# Patient Record
Sex: FEMALE | Race: WHITE | NOT HISPANIC OR LATINO | Employment: OTHER | URBAN - METROPOLITAN AREA
[De-identification: names, ages, dates, MRNs, and addresses within clinical notes are randomized per-mention and may not be internally consistent; named-entity substitution may affect disease eponyms.]

---

## 2018-04-03 ENCOUNTER — HOSPITAL ENCOUNTER (OUTPATIENT)
Dept: FAMILY MEDICINE CLINIC | Facility: CLINIC | Age: 78
Discharge: HOME | End: 2018-04-03
Attending: NURSE PRACTITIONER | Admitting: NURSE PRACTITIONER

## 2018-08-01 ENCOUNTER — HOSPITAL ENCOUNTER (OUTPATIENT)
Dept: GENERAL RADIOLOGY | Facility: HOSPITAL | Age: 78
Discharge: HOME | End: 2018-08-01
Attending: NURSE PRACTITIONER | Admitting: NURSE PRACTITIONER

## 2019-06-21 ENCOUNTER — CLINICAL SUPPORT (OUTPATIENT)
Dept: FAMILY MEDICINE CLINIC | Facility: CLINIC | Age: 79
End: 2019-06-21

## 2019-06-21 VITALS — SYSTOLIC BLOOD PRESSURE: 144 MMHG | DIASTOLIC BLOOD PRESSURE: 69 MMHG | HEART RATE: 72 BPM

## 2019-07-17 RX ORDER — LISINOPRIL 40 MG/1
1 TABLET ORAL DAILY
Refills: 0 | COMMUNITY
Start: 2019-04-18 | End: 2019-07-17 | Stop reason: SDUPTHER

## 2019-07-17 RX ORDER — BUMETANIDE 1 MG/1
1 TABLET ORAL DAILY
Qty: 90 TABLET | Refills: 0 | Status: SHIPPED | OUTPATIENT
Start: 2019-07-17 | End: 2019-12-02 | Stop reason: SDUPTHER

## 2019-07-17 RX ORDER — LISINOPRIL 40 MG/1
40 TABLET ORAL DAILY
Qty: 90 TABLET | Refills: 0 | Status: SHIPPED | OUTPATIENT
Start: 2019-07-17 | End: 2019-10-18 | Stop reason: SDUPTHER

## 2019-07-17 RX ORDER — BUMETANIDE 1 MG/1
1 TABLET ORAL DAILY
COMMUNITY
Start: 2015-10-21 | End: 2019-07-17 | Stop reason: SDUPTHER

## 2019-09-06 PROBLEM — Z72.89 OTHER PROBLEMS RELATED TO LIFESTYLE: Status: ACTIVE | Noted: 2017-12-18

## 2019-09-06 PROBLEM — R25.2 CRAMP OF EXTREMITY: Status: ACTIVE | Noted: 2017-01-25

## 2019-09-06 PROBLEM — R07.89 CHEST DISCOMFORT: Status: ACTIVE | Noted: 2018-04-03

## 2019-09-06 PROBLEM — R42 DIZZINESS: Status: ACTIVE | Noted: 2018-07-02

## 2019-09-06 PROBLEM — M54.9 BACK PAIN: Status: ACTIVE | Noted: 2018-04-03

## 2019-09-06 PROBLEM — J30.9 ALLERGIC RHINITIS: Status: ACTIVE | Noted: 2018-10-16

## 2019-09-06 PROBLEM — Z13.29 SCREENING FOR THYROID DISORDER: Status: ACTIVE | Noted: 2017-05-24

## 2019-09-06 PROBLEM — Z78.0 POSTMENOPAUSAL: Status: ACTIVE | Noted: 2017-12-18

## 2019-09-06 PROBLEM — R05.9 COUGH: Status: ACTIVE | Noted: 2018-04-03

## 2019-09-06 RX ORDER — PRAVASTATIN SODIUM 40 MG
1 TABLET ORAL NIGHTLY
COMMUNITY
Start: 2016-04-19 | End: 2020-08-12

## 2019-09-06 RX ORDER — CHOLECALCIFEROL (VITAMIN D3) 25 MCG
1 TABLET,CHEWABLE ORAL DAILY
COMMUNITY
Start: 2017-05-25

## 2019-09-09 ENCOUNTER — OFFICE VISIT (OUTPATIENT)
Dept: FAMILY MEDICINE CLINIC | Facility: CLINIC | Age: 79
End: 2019-09-09

## 2019-09-09 VITALS
HEIGHT: 64 IN | DIASTOLIC BLOOD PRESSURE: 79 MMHG | HEART RATE: 69 BPM | WEIGHT: 231 LBS | OXYGEN SATURATION: 94 % | SYSTOLIC BLOOD PRESSURE: 176 MMHG | TEMPERATURE: 98 F | BODY MASS INDEX: 39.44 KG/M2

## 2019-09-09 DIAGNOSIS — M54.50 ACUTE RIGHT-SIDED LOW BACK PAIN WITHOUT SCIATICA: Primary | ICD-10-CM

## 2019-09-09 DIAGNOSIS — M62.830 MUSCLE SPASM OF BACK: ICD-10-CM

## 2019-09-09 PROCEDURE — 99213 OFFICE O/P EST LOW 20 MIN: CPT | Performed by: FAMILY MEDICINE

## 2019-09-09 RX ORDER — PREDNISONE 20 MG/1
40 TABLET ORAL DAILY
Qty: 10 TABLET | Refills: 0 | Status: SHIPPED | OUTPATIENT
Start: 2019-09-09 | End: 2019-09-14

## 2019-09-09 RX ORDER — ALBUTEROL SULFATE 90 UG/1
2 AEROSOL, METERED RESPIRATORY (INHALATION) EVERY 4 HOURS PRN
COMMUNITY
Start: 2018-10-08

## 2019-09-09 RX ORDER — FUROSEMIDE 40 MG/1
40 TABLET ORAL DAILY
COMMUNITY
End: 2019-09-09

## 2019-09-09 RX ORDER — CYCLOBENZAPRINE HCL 5 MG
5 TABLET ORAL
Qty: 14 TABLET | Refills: 0 | Status: SHIPPED | OUTPATIENT
Start: 2019-09-09 | End: 2022-08-12

## 2019-09-09 NOTE — PROGRESS NOTES
Subjective   Neida Hernandez is a 79 y.o. female.   Chief Complaint   Patient presents with   • Back Pain     pain x 1 week         History of Present Illness   Presents today with a one-week history of right-sided lower back pain.  No injury.  No falls.  She did have a cold and was coughing a lot a week and half ago.  She thinks she may have pulled something when she coughed.  The pain does not radiate down into her leg.  No loss of bowel or bladder control.  She has a problem with recurrent kidney stones and went to see her urologist ordered a CT and told her there were no kidney stones.  She has had episodes of back pain off and on throughout her life but nothing has lasted 1 week.    Patient Active Problem List    Diagnosis Date Noted   • Muscle spasm of back 09/09/2019   • Allergic rhinitis 10/16/2018   • Dizziness 07/02/2018   • Back pain 04/03/2018   • Chest discomfort 04/03/2018   • Cough 04/03/2018   • Other problems related to lifestyle 12/18/2017   • Postmenopausal 12/18/2017   • Screening for thyroid disorder 05/24/2017   • Cramp of extremity 01/25/2017   • Visit for screening mammogram 07/29/2016   • Sjogren's syndrome (CMS/HCC) 01/20/2016   • Degenerative arthritis 10/21/2015   • Diabetes mellitus, type II (CMS/[facility]) 10/21/2015   • Dyslipidemia 10/21/2015   • Essential hypertension 10/21/2015   • Hernia of anterior abdominal wall 10/21/2015   • History of renal calculi 10/21/2015   • Obesity with body mass index 30 or greater 10/21/2015   • Pedal edema 10/21/2015   • Sleep apnea 10/21/2015           Past Surgical History:   Procedure Laterality Date   • CARDIAC CATHETERIZATION  06/2013   • REPLACEMENT TOTAL KNEE Left 2013   • VENTRAL HERNIA REPAIR      x 3        Current Outpatient Medications:   •  albuterol sulfate  (90 Base) MCG/ACT inhaler, Inhale 2 puffs Every 4 (Four) Hours As Needed., Disp: , Rfl:   •  bumetanide (BUMEX) 1 MG tablet, Take 1 tablet by mouth Daily., Disp: 90 tablet, Rfl: 0  •   lisinopril (PRINIVIL,ZESTRIL) 40 MG tablet, Take 1 tablet by mouth Daily., Disp: 90 tablet, Rfl: 0  •  LUTEIN PO, Take 1 capsule by mouth Daily., Disp: , Rfl:   •  Cyanocobalamin (B-12) 1000 MCG tablet controlled-release, Take 1 tablet by mouth Daily., Disp: , Rfl:   •  cyclobenzaprine (FLEXERIL) 5 MG tablet, Take 1 tablet by mouth every night at bedtime., Disp: 14 tablet, Rfl: 0  •  pravastatin (PRAVACHOL) 40 MG tablet, Take 1 tablet by mouth Every Night., Disp: , Rfl:   •  predniSONE (DELTASONE) 20 MG tablet, Take 2 tablets by mouth Daily for 5 days., Disp: 10 tablet, Rfl: 0  Allergies   Allergen Reactions   • Codeine GI Intolerance     Social History     Socioeconomic History   • Marital status:      Spouse name: Not on file   • Number of children: Not on file   • Years of education: Not on file   • Highest education level: Not on file   Tobacco Use   • Smoking status: Never Smoker   • Smokeless tobacco: Never Used   Substance and Sexual Activity   • Alcohol use: No     Frequency: Never   • Drug use: No     Family History   Problem Relation Age of Onset   • Hypertension Mother    • Heart disease Mother    • Stroke Mother    • Hypertension Father    • Heart disease Father    • No Known Problems Sister    • No Known Problems Daughter    • Kidney disease Son    • No Known Problems Sister    • No Known Problems Sister    • No Known Problems Sister    • No Known Problems Son    • No Known Problems Son      The following portions of the patient's history were reviewed and updated as appropriate: allergies, current medications, past family history, past medical history, past social history, past surgical history and problem list.    Review of Systems   Constitutional: Negative for chills and fever.   Respiratory: Negative for cough and shortness of breath.    Cardiovascular: Negative for chest pain.   Gastrointestinal: Negative for abdominal pain.   Neurological: Negative for light-headedness and headache.  "      Objective   /79 (BP Location: Right arm, Patient Position: Sitting, Cuff Size: Adult)   Pulse 69   Temp 98 °F (36.7 °C) (Oral)   Ht 163.8 cm (64.49\")   Wt 105 kg (231 lb)   SpO2 94%   BMI 39.05 kg/m²   Physical Exam   Constitutional: She is oriented to person, place, and time. She appears well-developed and well-nourished.   HENT:   Head: Normocephalic and atraumatic.   Neck: Neck supple. No JVD present.   Cardiovascular: Normal rate and regular rhythm.   Pulmonary/Chest: Effort normal. No respiratory distress. She exhibits no tenderness.   Abdominal: Soft. She exhibits no distension and no mass. There is no tenderness. There is no rebound and no guarding.   Musculoskeletal: She exhibits no edema.        Lumbar back: She exhibits decreased range of motion, tenderness (to right lower back under belt line) and spasm. She exhibits no swelling and no deformity.   Neurological: She is alert and oriented to person, place, and time. She displays no tremor. No sensory deficit. She exhibits normal muscle tone. Gait abnormal.   Skin: Skin is warm. No rash noted.   Psychiatric: She has a normal mood and affect. Her behavior is normal.               Assessment/Plan   Diagnoses and all orders for this visit:    1. Acute right-sided low back pain without sciatica (Primary)  -     XR Spine Lumbar 4+ View (In Office)    2. Muscle spasm of back  -     XR Spine Lumbar 4+ View (In Office)    Other orders  -     predniSONE (DELTASONE) 20 MG tablet; Take 2 tablets by mouth Daily for 5 days.  Dispense: 10 tablet; Refill: 0  -     cyclobenzaprine (FLEXERIL) 5 MG tablet; Take 1 tablet by mouth every night at bedtime.  Dispense: 14 tablet; Refill: 0    Apply heat to her lower back.  Flexeril at bedtime only.  Redness on burst.  Begin gentle stretching in 2 to 3 days.  We will do an x-ray to better evaluate her bony anatomy.  If she does not get relief within a week, call back and let me know and we will arrange an " outpatient physical therapy.  We are awaiting final report from her urologist just to verify the findings.  Call with any other problems or concerns before next visit.             Return if symptoms worsen or fail to improve.

## 2019-09-10 ENCOUNTER — TELEPHONE (OUTPATIENT)
Dept: FAMILY MEDICINE CLINIC | Facility: CLINIC | Age: 79
End: 2019-09-10

## 2019-09-10 NOTE — TELEPHONE ENCOUNTER
----- Message from Clarissa Ward MD sent at 9/10/2019  8:30 AM EDT -----  Please tell Neida that her x-ray of her lower back and did show a lot of arthritis in those joints called facet joints.  This is what we talked about in the office and what I suspected we would see.  The CAT scan that was done to rule out kidney stones did not see her lumbar spine at all, so there is no additional information there.  If her back does not feel better in a week, let me know.  Thanks

## 2019-10-18 RX ORDER — LISINOPRIL 40 MG/1
40 TABLET ORAL DAILY
Qty: 90 TABLET | Refills: 0 | Status: SHIPPED | OUTPATIENT
Start: 2019-10-18 | End: 2020-01-13 | Stop reason: SDUPTHER

## 2019-10-18 NOTE — TELEPHONE ENCOUNTER
Last OV: 9-9-19  Next OV: None scheduled yet  Last Labs: 4-3-18    Med is loaded and ready to refill.

## 2019-10-18 NOTE — TELEPHONE ENCOUNTER
Patient is needing a refill on her Lisinopril 40 Mg-Please send to [facility] in [place]

## 2019-12-02 DIAGNOSIS — R60.0 PEDAL EDEMA: ICD-10-CM

## 2019-12-02 DIAGNOSIS — I10 ESSENTIAL HYPERTENSION: Primary | ICD-10-CM

## 2019-12-02 RX ORDER — BUMETANIDE 1 MG/1
1 TABLET ORAL DAILY
Qty: 90 TABLET | Refills: 0 | Status: SHIPPED | OUTPATIENT
Start: 2019-12-02 | End: 2020-04-03 | Stop reason: SDUPTHER

## 2019-12-02 NOTE — TELEPHONE ENCOUNTER
Last OV: 9-9-19  Next OV: NONE scheduled  Last Labs: 4-3-18    Med is loaded and ready to send upon approval. Thanks.

## 2020-01-13 RX ORDER — LISINOPRIL 40 MG/1
40 TABLET ORAL DAILY
Qty: 90 TABLET | Refills: 0 | Status: SHIPPED | OUTPATIENT
Start: 2020-01-13 | End: 2020-04-03 | Stop reason: SDUPTHER

## 2020-02-13 ENCOUNTER — OFFICE VISIT (OUTPATIENT)
Dept: FAMILY MEDICINE CLINIC | Facility: CLINIC | Age: 80
End: 2020-02-13

## 2020-02-13 VITALS
HEIGHT: 64 IN | BODY MASS INDEX: 38.93 KG/M2 | TEMPERATURE: 97.8 F | WEIGHT: 228 LBS | DIASTOLIC BLOOD PRESSURE: 77 MMHG | HEART RATE: 72 BPM | SYSTOLIC BLOOD PRESSURE: 148 MMHG | OXYGEN SATURATION: 92 %

## 2020-02-13 DIAGNOSIS — J30.9 ALLERGIC RHINITIS, UNSPECIFIED SEASONALITY, UNSPECIFIED TRIGGER: Primary | ICD-10-CM

## 2020-02-13 PROCEDURE — 99213 OFFICE O/P EST LOW 20 MIN: CPT | Performed by: NURSE PRACTITIONER

## 2020-02-13 RX ORDER — FLUTICASONE PROPIONATE 50 MCG
2 SPRAY, SUSPENSION (ML) NASAL DAILY
Qty: 1 BOTTLE | Refills: 2 | Status: SHIPPED | OUTPATIENT
Start: 2020-02-13

## 2020-02-13 RX ORDER — AZITHROMYCIN 250 MG/1
TABLET, FILM COATED ORAL
Qty: 6 TABLET | Refills: 0 | Status: SHIPPED | OUTPATIENT
Start: 2020-02-13 | End: 2020-08-12

## 2020-02-13 RX ORDER — PSEUDOEPHEDRINE HCL 30 MG
30 TABLET ORAL EVERY 4 HOURS PRN
Qty: 30 TABLET | Refills: 2 | Status: SHIPPED | OUTPATIENT
Start: 2020-02-13 | End: 2020-08-12

## 2020-02-13 NOTE — PROGRESS NOTES
"    Neida Hernandez is a 80 y.o. female.      79-year-old obese white female with history of stenting for arthritis type 2 diabetes, hyperlipidemia, hypertension, hiatal hernia and kidney stones who comes in today with complaints of nonproductive cough and nasal congestion.  Exam reveals severe allergic rhinitis and a placing her on allergy medication.  Patient states she goes into sinus infections easily and is going on a trip so I ordered her Z-Nicolas to take with her in case she needs it   blood pressure 148/76 heart rate 72 denies any chest pain, dyspnea, tachycardia dizziness      Home Allegra/ Flonase nasal spray/ Sudafed 30 mg q.i.d.   Z-Nicolas       The following portions of the patient's history were reviewed and updated as appropriate: allergies, current medications, past family history, past medical history, past social history, past surgical history and problem list.    Vitals:    02/13/20 0855   BP: 148/77   BP Location: Right arm   Patient Position: Sitting   Cuff Size: Large Adult   Pulse: 72   Temp: 97.8 °F (36.6 °C)   TempSrc: Oral   SpO2: 92%   Weight: 103 kg (228 lb)   Height: 162.6 cm (64\")     Body mass index is 39.14 kg/m².    Past Medical History:   Diagnosis Date   • Abdominal wall hernia    • Degenerative arthritis    • Glucose intolerance    • Hypertension    • Nephrolithiasis    • Pedal edema    • Sinusitis    • Sleep apnea      Past Surgical History:   Procedure Laterality Date   • CARDIAC CATHETERIZATION  06/2013   • REPLACEMENT TOTAL KNEE Left 2013   • VENTRAL HERNIA REPAIR      x 3      Family History   Problem Relation Age of Onset   • Hypertension Mother    • Heart disease Mother    • Stroke Mother    • Hypertension Father    • Heart disease Father    • No Known Problems Sister    • No Known Problems Daughter    • Kidney disease Son    • No Known Problems Sister    • No Known Problems Sister    • No Known Problems Sister    • No Known Problems Son    • No Known Problems Son  "     Immunization History   Administered Date(s) Administered   • Fluzone High Dose =>65 Years ([employer] ONLY) 10/21/2015, 10/26/2016, 11/17/2019   • Pneumococcal Conjugate 13-Valent (PCV13) 10/28/2015   • Pneumococcal Polysaccharide (PPSV23) 01/25/2017       Office Visit Converted on 04/03/2018   Component Date Value Ref Range Status   • BUN 01/21/2016 18  7 - 25 mg/dL Final   • BUN/Creatinine Ratio 01/21/2016 NOT APPLICABLE (calc)  6 - 22 Final   • Calcium 01/21/2016 9.3  8.6 - 10.4 mg/dL Final   • Chloride 01/21/2016 102  98 - 110 mmol/L Final   • CO2 CONTENT  01/21/2016 30  19 - 30 mmol/L Final   • Creatinine 01/21/2016 0.88  0.60 - 0.93 mg/dL Final   • eGFR African Am 01/21/2016 64  > OR = 60 mL/min/1.73m2 Final   • eGFR Non  Am 01/21/2016 74  > OR = 60 mL/min/1.73m2 Final   • Glucose 01/21/2016 125* 65 - 99 mg/dL Final   • Potassium 01/21/2016 4.2  3.5 - 5.3 mmol/L Final   • Sodium 01/21/2016 142  135 - 146 mmol/L Final   • TSH 01/21/2016 1.58  0.40 - 4.50 mIU/L Final   • Albumin 07/26/2016 3.9  3.6 - 5.1 g/dL Final   • Alkaline Phosphatase 07/26/2016 62  33 - 130 units/L Final   • Total Bilirubin 07/26/2016 0.4  0.2 - 1.2 mg/dL Final   • BUN 07/26/2016 16  7 - 25 mg/dL Final   • BUN/Creatinine Ratio 07/26/2016 NOT APPLICABLE (calc)  6 - 22 Final   • Calcium 07/26/2016 8.9  8.6 - 10.4 mg/dL Final   • Chloride 07/26/2016 103  98 - 110 mmol/L Final   • Chol/HDL Ratio 07/26/2016 4.6 (calc)  < OR = 5.0 Final   • Total Cholesterol 07/26/2016 180  125 - 200 mg/dL Final   • CO2 CONTENT  07/26/2016 31* 19 - 30 mmol/L Final   • Creatinine 07/26/2016 0.87  0.60 - 0.93 mg/dL Final   • eGFR African Am 07/26/2016 65  > OR = 60 mL/min/1.73m2 Final   • eGFR Non African Am 07/26/2016 75  > OR = 60 mL/min/1.73m2 Final   • Globulin 07/26/2016 2.7 G/DL (CALC)  1.9 - 3.7 g/dL Final   • Glucose 07/26/2016 120* 65 - 99 mg/dL Final   • HDL Cholesterol 07/26/2016 39* > OR = 46 mg/dL Final   • % Hgb A1C 07/26/2016 7.0 % OF TOTAL  HGB* <5.7 % Final   • LDL Cholesterol  07/26/2016 99 MG/DL (CALC)  <130 mg/dL Final   • Potassium 07/26/2016 4.3  3.5 - 5.3 mmol/L Final   • Total Protein 07/26/2016 6.6  6.1 - 8.1 g/dL Final   • AST (SGOT) 07/26/2016 15  10 - 35 units/L Final   • ALT (SGPT) 07/26/2016 14  6 - 29 units/L Final   • Sodium 07/26/2016 138  135 - 146 mmol/L Final   • Triglycerides 07/26/2016 212* <150 mg/dL Final   • A/G Ratio 07/26/2016 1.4 (calc)  1.0 - 2.5 Final   • Albumin 10/26/2016 3.5* 3.6 - 5.1 g/dL Final   • Alkaline Phosphatase 10/26/2016 59  33 - 130 units/L Final   • Glucose 10/26/2016 122* 65 - 99 mg/dL Final   • Total Bilirubin 10/26/2016 0.5  0.2 - 1.2 mg/dL Final   • BUN 10/26/2016 18  7 - 25 mg/dL Final   • BUN/Creatinine Ratio 10/26/2016 22.5 (calc)* 6 - 22 Final   • Calcium 10/26/2016 9.1  8.6 - 10.2 mg/dL Final   • Chloride 10/26/2016 103  98 - 110 mmol/L Final   • Chol/HDL Ratio 10/26/2016 5.0 (calc)  <5.1 Final   • Total Cholesterol 10/26/2016 192  125 - 200 mg/dL Final   • CO2 10/26/2016 30  21 - 33 mmol/L Final   • Creatinine 10/26/2016 0.8  0.63 - 1.22 mg/dL Final   • eGFR  Am 10/26/2016 >60 mL/min/1.73m2  >59 mL/min/1.73m2 Final   • eGFR Non African Am 10/26/2016 >60 mL/min/1.73m2  >59 mL/min/1.73m2 Final   • Globulin 10/26/2016 3.0 G/DL (CALC)  2.2 - 3.9 g/dL Final   • HDL Cholesterol 10/26/2016 38* >46 mg/dL Final   • % Hgb A1C 10/26/2016 7.0 % OF TOTAL HGB* <5.7 % Final   • LDL Cholesterol  10/26/2016 113  <130 mg/dL Final   • Potassium 10/26/2016 4.3  3.5 - 5.3 mmol/L Final   • Total Protein 10/26/2016 6.5  6.2 - 8.3 g/dL Final   • AST (SGOT) 10/26/2016 21  10 - 35 units/L Final   • ALT (SGPT) 10/26/2016 18  6 - 40 units/L Final   • Sodium 10/26/2016 141  135 - 146 mmol/L Final   • Triglycerides 10/26/2016 205* <150 mg/dL Final   • A/G Ratio 10/26/2016 1.2 (calc)  1.0 - 2.1 Final   • Albumin 01/26/2017 3.7  3.6 - 5.1 g/dL Final   • Alkaline Phosphatase 01/26/2017 61  33 - 130 units/L Final   •  Glucose 01/26/2017 115* 65 - 99 mg/dL Final   • Total Bilirubin 01/26/2017 0.5  0.2 - 1.2 mg/dL Final   • BUN 01/26/2017 14  7 - 25 mg/dL Final   • BUN/Creatinine Ratio 01/26/2017 17.5 (calc)  6 - 22 Final   • Calcium 01/26/2017 9.1  8.6 - 10.2 mg/dL Final   • Chloride 01/26/2017 102  98 - 110 mmol/L Final   • CO2 01/26/2017 30  21 - 33 mmol/L Final   • Creatinine 01/26/2017 0.8  0.63 - 1.22 mg/dL Final   • eGFR  Am 01/26/2017 >60 mL/min/1.73m2  >59 mL/min/1.73m2 Final   • eGFR Non African Am 01/26/2017 >60 mL/min/1.73m2  >59 mL/min/1.73m2 Final   • Globulin 01/26/2017 2.9 G/DL (CALC)  2.2 - 3.9 g/dL Final   • % Hgb A1C 01/26/2017 6.8 % OF TOTAL HGB* <5.7 % Final   • Magnesium 01/26/2017 2.2  1.5 - 2.5 mg/dL Final   • Potassium 01/26/2017 3.7  3.5 - 5.3 mmol/L Final   • Total Protein 01/26/2017 6.6  6.2 - 8.3 g/dL Final   • AST (SGOT) 01/26/2017 22  10 - 35 units/L Final   • ALT (SGPT) 01/26/2017 20  6 - 40 units/L Final   • Sodium 01/26/2017 140  135 - 146 mmol/L Final   • A/G Ratio 01/26/2017 1.3 (calc)  1.0 - 2.1 Final   • Albumin 05/25/2017 4.1  3.6 - 5.1 g/dL Final   • Alkaline Phosphatase 05/25/2017 52  33 - 130 units/L Final   • Vitamin B-12 05/25/2017 152* 200 - 1100 pg/mL Final   • Basophils Absolute 05/25/2017 48 CELLS/UL  0 - 200 10*3/mm3 Final   • Basophil Rel % 05/25/2017 0.7  % Final   • Total Bilirubin 05/25/2017 0.4  0.2 - 1.2 mg/dL Final   • BNP 05/25/2017 26  <100 pg/mL Final   • BUN 05/25/2017 20  7 - 25 mg/dL Final   • BUN/Creatinine Ratio 05/25/2017 NOT APPLICABLE (calc)  6 - 22 Final   • Calcium 05/25/2017 9.5  8.6 - 10.4 mg/dL Final   • Chloride 05/25/2017 103  98 - 110 mmol/L Final   • Chol/HDL Ratio 05/25/2017 4.1 (calc)  < OR = 5.0 Final   • Total Cholesterol 05/25/2017 160  125 - 200 mg/dL Final   • CO2 CONTENT  05/25/2017 29  20 - 31 mmol/L Final   • Creatinine 05/25/2017 0.87  0.60 - 0.93 mg/dL Final   • eGFR African Am 05/25/2017 64  > OR = 60 mL/min/1.73m2 Final   • eGFR Non   Am 05/25/2017 74  > OR = 60 mL/min/1.73m2 Final   • Eosinophils Absolute 05/25/2017 156 CELLS/UL  15 - 500 10*3/mm3 Final   • Eosinophil Rel % 05/25/2017 2.3  % Final   • Globulin 05/25/2017 2.6 G/DL (CALC)  1.9 - 3.7 g/dL Final   • Glucose 05/25/2017 118* 65 - 99 mg/dL Final   • Hematocrit 05/25/2017 38.0  35.0 - 45.0 % Final   • HDL Cholesterol 05/25/2017 39* > OR = 46 mg/dL Final   • Hemoglobin 05/25/2017 12.5  11.7 - 15.5 g/dL Final   • % Hgb A1C 05/25/2017 6.7 % OF TOTAL HGB* <5.7 % Final   • LDL Cholesterol  05/25/2017 88 MG/DL (CALC)  <130 mg/dL Final   • Lymphocytes Absolute 05/25/2017 2679 CELLS/UL  850 - 3900 10*3/mm3 Final   • Lymphocyte Rel % 05/25/2017 39.4  % Final   • MCH 05/25/2017 30.7  27.0 - 33.0 pg Final   • MCHC 05/25/2017 32.9 G/DL  32.0 - 36.0 % Final   • MCV 05/25/2017 93.4  80.0 - 100.0 fL Final   • Monocyte Rel % 05/25/2017 8.9  % Final   • Monocytes Absolute 05/25/2017 605 CELLS/UL  200 - 950 10*3/microliter Final   • MPV 05/25/2017 11.1  7.5 - 12.5 fL Final   • Neutrophils Absolute 05/25/2017 3312 CELLS/UL  1500 - 7800 10*3/mm3 Final   • Platelets 05/25/2017 235 THOUSAND/UL  140 - 400 10*3/mm3 Final   • Neutrophils, Fluid 05/25/2017 48.7  % Final   • Potassium 05/25/2017 4.2  3.5 - 5.3 mmol/L Final   • Total Protein 05/25/2017 6.7  6.1 - 8.1 g/dL Final   • RBC 05/25/2017 4.07 MILLION/UL  3.80 - 5.10 10*6/mm3 Final   • RDW 05/25/2017 12.9  11.0 - 15.0 % Final   • AST (SGOT) 05/25/2017 14  10 - 35 units/L Final   • ALT (SGPT) 05/25/2017 13  6 - 29 units/L Final   • Sodium 05/25/2017 143  135 - 146 mmol/L Final   • Triglycerides 05/25/2017 165* <150 mg/dL Final   • TSH 05/25/2017 1.29  0.40 - 4.50 mIU/L Final   • WBC 05/25/2017 6.8 THOUSAND/UL  3.8 - 10.8 K/uL Final   • A/G Ratio 05/25/2017 1.6 (calc)  1.0 - 2.5 Final   • Vitamin B-12 11/02/2017 653  200 - 1100 pg/mL Final   • BUN 12/19/2017 19  7 - 25 mg/dL Final   • BUN/Creatinine Ratio 12/19/2017 NOT APPLICABLE (calc)  6 - 22  Final   • Calcium 12/19/2017 9.3  8.6 - 10.4 mg/dL Final   • Chloride 12/19/2017 103  98 - 110 mmol/L Final   • CO2 CONTENT  12/19/2017 31  20 - 31 mmol/L Final   • Creatinine 12/19/2017 0.80  0.60 - 0.93 mg/dL Final   • eGFR African Am 12/19/2017 71  > OR = 60 mL/min/1.73m2 Final   • eGFR Non  Am 12/19/2017 82  > OR = 60 mL/min/1.73m2 Final   • Glucose 12/19/2017 129* 65 - 99 mg/dL Final   • % Hgb A1C 12/19/2017 6.6 % OF TOTAL HGB* <5.7 % Final   • Potassium 12/19/2017 4.4  3.5 - 5.3 mmol/L Final   • Sodium 12/19/2017 140  135 - 146 mmol/L Final   • Hepatitis C Ab 12/19/2017 NON-REACTIVE  NON-REACTIVE Final   • Comment 12/19/2017 0.01  <1.00 Final   • Albumin 04/03/2018 4.1  3.6 - 5.1 g/dL Final   • Alkaline Phosphatase 04/03/2018 66  33 - 130 units/L Final   • Basophils Absolute 04/03/2018 63 CELLS/UL  0 - 200 10*3/mm3 Final   • Basophil Rel % 04/03/2018 0.8  % Final   • Total Bilirubin 04/03/2018 0.3  0.2 - 1.2 mg/dL Final   • BUN 04/03/2018 16  7 - 25 mg/dL Final   • BUN/Creatinine Ratio 04/03/2018 NOT APPLICABLE (calc)  6 - 22 Final   • Calcium 04/03/2018 9.0  8.6 - 10.4 mg/dL Final   • Chloride 04/03/2018 102  98 - 110 mmol/L Final   • CO2 CONTENT  04/03/2018 29  20 - 31 mmol/L Final   • Creatinine 04/03/2018 0.77  0.60 - 0.93 mg/dL Final   • eGFR African Am 04/03/2018 74  > OR = 60 mL/min/1.73m2 Final   • eGFR Non  Am 04/03/2018 86  > OR = 60 mL/min/1.73m2 Final   • Eosinophils Absolute 04/03/2018 119 CELLS/UL  15 - 500 10*3/mm3 Final   • Eosinophil Rel % 04/03/2018 1.5  % Final   • Globulin 04/03/2018 2.4 G/DL (CALC)  1.9 - 3.7 g/dL Final   • Glucose 04/03/2018 127  65 - 139 mg/dL Final   • Hematocrit 04/03/2018 36.3  35.0 - 45.0 % Final   • Hemoglobin 04/03/2018 12.2  11.7 - 15.5 g/dL Final   • Lymphocytes Absolute 04/03/2018 2449 CELLS/UL  850 - 3900 10*3/mm3 Final   • Lymphocyte Rel % 04/03/2018 31.0  % Final   • MCH 04/03/2018 31.0  27.0 - 33.0 pg Final   • MCHC 04/03/2018 33.6 G/DL  32.0 -  36.0 % Final   • MCV 04/03/2018 92.1  80.0 - 100.0 fL Final   • Monocyte Rel % 04/03/2018 9.6  % Final   • Monocytes Absolute 04/03/2018 758 CELLS/UL  200 - 950 10*3/microliter Final   • MPV 04/03/2018 10.2  7.5 - 12.5 fL Final   • Neutrophils Absolute 04/03/2018 4511 CELLS/UL  1500 - 7800 10*3/mm3 Final   • Platelets 04/03/2018 231 THOUSAND/UL  140 - 400 10*3/mm3 Final   • Neutrophils, Fluid 04/03/2018 57.1  % Final   • Potassium 04/03/2018 4.2  3.5 - 5.3 mmol/L Final   • Total Protein 04/03/2018 6.5  6.1 - 8.1 g/dL Final   • RBC 04/03/2018 3.94 MILLION/UL  3.80 - 5.10 10*6/mm3 Final   • RDW 04/03/2018 12.7  11.0 - 15.0 % Final   • AST (SGOT) 04/03/2018 15  10 - 35 units/L Final   • ALT (SGPT) 04/03/2018 14  6 - 29 units/L Final   • Sodium 04/03/2018 140  135 - 146 mmol/L Final   • WBC 04/03/2018 7.9 THOUSAND/UL  3.8 - 10.8 K/uL Final   • A/G Ratio 04/03/2018 1.7 (calc)  1.0 - 2.5 Final         Review of Systems   Constitutional: Negative.    HENT: Positive for postnasal drip, rhinorrhea and sinus pressure.    Respiratory: Positive for cough.    Cardiovascular: Negative.    Gastrointestinal: Negative.    Genitourinary: Negative.    Musculoskeletal: Negative.    Skin: Negative.    Neurological: Negative.    Hematological: Negative.    Psychiatric/Behavioral: Negative.        Objective   Physical Exam   Constitutional: She is oriented to person, place, and time. She appears well-developed and well-nourished.   HENT:   Heavy postnasal drip with swollen terminates and bulging TMs   Cardiovascular: Normal rate and regular rhythm.   Pulmonary/Chest: Effort normal and breath sounds normal.   Abdominal: Soft. Bowel sounds are normal.   Musculoskeletal: Normal range of motion.   Neurological: She is alert and oriented to person, place, and time.   Skin: Skin is warm and dry.   Psychiatric: She has a normal mood and affect.       Procedures    Assessment/Plan   Neida was seen today for cough.    Diagnoses and all orders for  this visit:    Allergic rhinitis, unspecified seasonality, unspecified trigger    Other orders  -     pseudoephedrine (SUDAFED) 30 MG tablet; Take 1 tablet by mouth Every 4 (Four) Hours As Needed for Congestion.  -     fluticasone (FLONASE) 50 MCG/ACT nasal spray; 2 sprays into the nostril(s) as directed by provider Daily.  -     azithromycin (ZITHROMAX Z-VERONA) 250 MG tablet; Take 2 tablets the first day, then 1 tablet daily for 4 days.          Current Outpatient Medications:   •  albuterol sulfate  (90 Base) MCG/ACT inhaler, Inhale 2 puffs Every 4 (Four) Hours As Needed., Disp: , Rfl:   •  bumetanide (BUMEX) 1 MG tablet, Take 1 tablet by mouth Daily., Disp: 90 tablet, Rfl: 0  •  Cyanocobalamin (B-12) 1000 MCG tablet controlled-release, Take 1 tablet by mouth Daily., Disp: , Rfl:   •  cyclobenzaprine (FLEXERIL) 5 MG tablet, Take 1 tablet by mouth every night at bedtime., Disp: 14 tablet, Rfl: 0  •  lisinopril (PRINIVIL,ZESTRIL) 40 MG tablet, Take 1 tablet by mouth Daily., Disp: 90 tablet, Rfl: 0  •  LUTEIN PO, Take 1 capsule by mouth Daily., Disp: , Rfl:   •  pravastatin (PRAVACHOL) 40 MG tablet, Take 1 tablet by mouth Every Night., Disp: , Rfl:   •  azithromycin (ZITHROMAX Z-VERONA) 250 MG tablet, Take 2 tablets the first day, then 1 tablet daily for 4 days., Disp: 6 tablet, Rfl: 0  •  fluticasone (FLONASE) 50 MCG/ACT nasal spray, 2 sprays into the nostril(s) as directed by provider Daily., Disp: 1 bottle, Rfl: 2  •  pseudoephedrine (SUDAFED) 30 MG tablet, Take 1 tablet by mouth Every 4 (Four) Hours As Needed for Congestion., Disp: 30 tablet, Rfl: 2

## 2020-04-03 DIAGNOSIS — R60.0 PEDAL EDEMA: ICD-10-CM

## 2020-04-03 DIAGNOSIS — I10 ESSENTIAL HYPERTENSION: ICD-10-CM

## 2020-04-03 NOTE — TELEPHONE ENCOUNTER
TC from patient she is requesting refill on her     Bumetanide 1 mg QD # 90     Lisinopril 40 mg QD # 90     Please send to [facility]

## 2020-04-06 RX ORDER — LISINOPRIL 40 MG/1
40 TABLET ORAL DAILY
Qty: 90 TABLET | Refills: 1 | Status: SHIPPED | OUTPATIENT
Start: 2020-04-06 | End: 2020-07-10 | Stop reason: SDUPTHER

## 2020-04-06 RX ORDER — LISINOPRIL 40 MG/1
40 TABLET ORAL DAILY
Qty: 90 TABLET | Refills: 0 | Status: SHIPPED | OUTPATIENT
Start: 2020-04-06 | End: 2020-04-06 | Stop reason: SDUPTHER

## 2020-04-06 RX ORDER — BUMETANIDE 1 MG/1
1 TABLET ORAL DAILY
Qty: 90 TABLET | Refills: 0 | Status: SHIPPED | OUTPATIENT
Start: 2020-04-06 | End: 2020-07-10 | Stop reason: SDUPTHER

## 2020-07-10 DIAGNOSIS — I10 ESSENTIAL HYPERTENSION: ICD-10-CM

## 2020-07-10 DIAGNOSIS — R60.0 PEDAL EDEMA: ICD-10-CM

## 2020-07-10 RX ORDER — LISINOPRIL 40 MG/1
40 TABLET ORAL DAILY
Qty: 90 TABLET | Refills: 1 | Status: SHIPPED | OUTPATIENT
Start: 2020-07-10 | End: 2020-10-14 | Stop reason: SDUPTHER

## 2020-07-10 RX ORDER — BUMETANIDE 1 MG/1
1 TABLET ORAL DAILY
Qty: 90 TABLET | Refills: 0 | Status: SHIPPED | OUTPATIENT
Start: 2020-07-10 | End: 2020-10-14 | Stop reason: SDUPTHER

## 2020-07-10 NOTE — TELEPHONE ENCOUNTER
Please call patient and tell her I refilled her medicines as requested, but I have not seen her in nearly a year.  Please ask her to schedule an appointment within the next month or 2.  Thanks

## 2020-07-10 NOTE — TELEPHONE ENCOUNTER
Called patient to let her know you have refilled her medications and made her appointment mid august

## 2020-08-12 ENCOUNTER — OFFICE VISIT (OUTPATIENT)
Dept: FAMILY MEDICINE CLINIC | Facility: CLINIC | Age: 80
End: 2020-08-12

## 2020-08-12 VITALS
HEART RATE: 92 BPM | WEIGHT: 227 LBS | TEMPERATURE: 97.7 F | OXYGEN SATURATION: 94 % | HEIGHT: 64 IN | BODY MASS INDEX: 38.76 KG/M2 | SYSTOLIC BLOOD PRESSURE: 130 MMHG | DIASTOLIC BLOOD PRESSURE: 69 MMHG

## 2020-08-12 DIAGNOSIS — E78.5 DYSLIPIDEMIA: ICD-10-CM

## 2020-08-12 DIAGNOSIS — K43.9 HERNIA OF ANTERIOR ABDOMINAL WALL: ICD-10-CM

## 2020-08-12 DIAGNOSIS — E11.65 TYPE 2 DIABETES MELLITUS WITH HYPERGLYCEMIA, WITHOUT LONG-TERM CURRENT USE OF INSULIN (HCC): ICD-10-CM

## 2020-08-12 DIAGNOSIS — I10 ESSENTIAL HYPERTENSION: Primary | ICD-10-CM

## 2020-08-12 DIAGNOSIS — R53.83 FATIGUE, UNSPECIFIED TYPE: ICD-10-CM

## 2020-08-12 DIAGNOSIS — G47.30 SLEEP APNEA, UNSPECIFIED TYPE: ICD-10-CM

## 2020-08-12 PROCEDURE — 99214 OFFICE O/P EST MOD 30 MIN: CPT | Performed by: FAMILY MEDICINE

## 2020-08-12 NOTE — PROGRESS NOTES
Subjective   Neida Hernandez is a 80 y.o. female.   Chief Complaint   Patient presents with   • Hypertension   • Hyperlipidemia       History of Present Illness     Comes in today for follow-up on chronic medical problems per problem list as below.  It is been nearly a year since I have seen her.  I continued to refill her medicines and asked her to come in for follow-up.      She was having some abdominal pain and went over the [facility] 2 weeks ago.  CT scan of the abdomen was done and it showed a right-sided parasagittal ventral abdominal wall hernia containing fat and small bowel.  There was minimal inflammatory change in the subcutaneous fat external to the hernia sac but no inflammation within the sac.  That a low suspicion of incarceration.  There is a notation of an anterior abdominal wall hernia going back to 2015 in her chart.  CMP in early August was grossly normal except for a slightly high blood sugar.  She has had 2 other hernias repaired and is not interested in going to see a surgeon about this current hernia.    History of diabetes type 2.  She has not had any labs done since I started seeing her.  She had an A1c of 6.6% in 2018.  Blood sugar was 169 on the labs at [facility].    She denies any symptoms of low blood sugar.  Hypertension-blood pressure today is good at 130/69.  Tolerating medications without side effects such as swelling or headache.    Hyperlipidemia-she quit taking her pravastatin because she heard it can hurt muscles.  Wants to get cholesterol checked anyway    [facility] - uses CPAP with O2 at bedtime.    She remains tired a lot and has been losing some hair.  She asks me if her thyroid could be going bad.      Patient Active Problem List    Diagnosis Date Noted   • Muscle spasm of back 09/09/2019   • Allergic rhinitis 10/16/2018   • Dizziness 07/02/2018   • Back pain 04/03/2018   • Chest discomfort 04/03/2018   • Cough 04/03/2018   • Other problems related to lifestyle 12/18/2017   •  "Postmenopausal 12/18/2017   • Screening for thyroid disorder 05/24/2017   • Cramp of extremity 01/25/2017   • Visit for screening mammogram 07/29/2016   • Sjogren's syndrome (CMS/HCC) 01/20/2016   • Degenerative arthritis 10/21/2015   • Diabetes mellitus, type II (CMS/HCC) 10/21/2015     Note Last Updated: 8/12/2020     \"borderline\" - diagnosed years ago     • Dyslipidemia 10/21/2015   • Essential hypertension 10/21/2015   • Hernia of anterior abdominal wall 10/21/2015   • History of renal calculi 10/21/2015   • Obesity with body mass index 30 or greater 10/21/2015   • Pedal edema 10/21/2015   • Sleep apnea 10/21/2015     Note Last Updated: 8/12/2020     Uses CPAP with Oxygen at night only- got hypoxic during knee surgery             Past Surgical History:   Procedure Laterality Date   • CARDIAC CATHETERIZATION  06/2013   • REPLACEMENT TOTAL KNEE Left 2013   • VENTRAL HERNIA REPAIR      x 3      Current Outpatient Medications on File Prior to Visit   Medication Sig   • albuterol sulfate  (90 Base) MCG/ACT inhaler Inhale 2 puffs Every 4 (Four) Hours As Needed.   • bumetanide (BUMEX) 1 MG tablet Take 1 tablet by mouth Daily.   • Cyanocobalamin (B-12) 1000 MCG tablet controlled-release Take 1 tablet by mouth Daily.   • cyclobenzaprine (FLEXERIL) 5 MG tablet Take 1 tablet by mouth every night at bedtime.   • fluticasone (FLONASE) 50 MCG/ACT nasal spray 2 sprays into the nostril(s) as directed by provider Daily.   • lisinopril (PRINIVIL,ZESTRIL) 40 MG tablet Take 1 tablet by mouth Daily.   • LUTEIN PO Take 1 capsule by mouth Daily.   • [DISCONTINUED] azithromycin (ZITHROMAX Z-VERONA) 250 MG tablet Take 2 tablets the first day, then 1 tablet daily for 4 days.   • [DISCONTINUED] pravastatin (PRAVACHOL) 40 MG tablet Take 1 tablet by mouth Every Night.   • [DISCONTINUED] pseudoephedrine (SUDAFED) 30 MG tablet Take 1 tablet by mouth Every 4 (Four) Hours As Needed for Congestion.     No current facility-administered " "medications on file prior to visit.      Allergies   Allergen Reactions   • Codeine GI Intolerance     Social History     Socioeconomic History   • Marital status:      Spouse name: Not on file   • Number of children: Not on file   • Years of education: Not on file   • Highest education level: Not on file   Tobacco Use   • Smoking status: Never Smoker   • Smokeless tobacco: Never Used   Substance and Sexual Activity   • Alcohol use: No     Frequency: Never   • Drug use: No     Family History   Problem Relation Age of Onset   • Hypertension Mother    • Heart disease Mother    • Stroke Mother    • Hypertension Father    • Heart disease Father    • No Known Problems Sister    • No Known Problems Daughter    • Kidney disease Son    • No Known Problems Sister    • No Known Problems Sister    • No Known Problems Sister    • No Known Problems Son    • No Known Problems Son      The following portions of the patient's history were reviewed and updated as appropriate: allergies, current medications, past family history, past medical history, past social history, past surgical history and problem list.    Review of Systems   Constitutional: Negative for chills and fever.   Respiratory: Negative for cough, choking and wheezing.    Cardiovascular: Negative for palpitations and leg swelling.   Gastrointestinal: Negative for abdominal pain.   Neurological: Negative for headache.       Objective   /69 (BP Location: Left arm, Patient Position: Sitting, Cuff Size: Adult)   Pulse 92   Temp 97.7 °F (36.5 °C) (Infrared)   Ht 162.6 cm (64.02\")   Wt 103 kg (227 lb)   LMP  (LMP Unknown)   SpO2 94%   BMI 38.94 kg/m²   Physical Exam   Constitutional: She is oriented to person, place, and time. She appears well-developed and well-nourished.   Wearing a face mask     HENT:   Head: Normocephalic and atraumatic.   Eyes: Conjunctivae and EOM are normal.   Neck: Normal range of motion.   Cardiovascular: Normal rate. "   Pulmonary/Chest: Effort normal.   Abdominal: She exhibits no distension and no mass. A hernia (right paracentral ventral hernia - easily reducible) is present.   Musculoskeletal: Normal range of motion.   Neurological: She is alert and oriented to person, place, and time.   Skin: Skin is warm and dry. No rash noted.   Psychiatric: She has a normal mood and affect. Her behavior is normal.         Assessment/Plan   Diagnoses and all orders for this visit:    1. Essential hypertension (Primary)    2. Type 2 diabetes mellitus with hyperglycemia, without long-term current use of insulin (CMS/[facility])  -     Hemoglobin A1c    3. Hernia of anterior abdominal wall    4. Dyslipidemia  -     Lipid Panel  -     Comprehensive Metabolic Panel    5. Sleep apnea, unspecified type    6. Fatigue, unspecified type  -     TSH    Blood pressure is well controlled.  Continue current medications.  From my standpoint, she does not want to take her statin, and at 80 years old there is very little data to support insisting she continue to take it.  She would like to get her thyroid checked and her cholesterol checked 2.  I will get an A1c just to make sure her diabetes is not extraordinarily out of control.  We will follow-up with her when the results of the lab test are available.  I did tell her that if her abdominal pain worsened then it may be time to see a surgeon again regarding her abdominal wall hernia.             Return in about 6 months (around 2/12/2021).    Call with any problems or concerns before next visit

## 2020-08-13 LAB
ALBUMIN SERPL-MCNC: 4 G/DL (ref 3.7–4.7)
ALBUMIN/GLOB SERPL: 1.5 {RATIO} (ref 1.2–2.2)
ALP SERPL-CCNC: 63 IU/L (ref 39–117)
ALT SERPL-CCNC: 14 IU/L (ref 0–32)
AST SERPL-CCNC: 15 IU/L (ref 0–40)
BILIRUB SERPL-MCNC: 0.3 MG/DL (ref 0–1.2)
BUN SERPL-MCNC: 21 MG/DL (ref 8–27)
BUN/CREAT SERPL: 22 (ref 12–28)
CALCIUM SERPL-MCNC: 9.1 MG/DL (ref 8.7–10.3)
CHLORIDE SERPL-SCNC: 101 MMOL/L (ref 96–106)
CHOLEST SERPL-MCNC: 191 MG/DL (ref 100–199)
CO2 SERPL-SCNC: 27 MMOL/L (ref 20–29)
CREAT SERPL-MCNC: 0.94 MG/DL (ref 0.57–1)
GLOBULIN SER CALC-MCNC: 2.6 G/DL (ref 1.5–4.5)
GLUCOSE SERPL-MCNC: 149 MG/DL (ref 65–99)
HBA1C MFR BLD: 7.1 % (ref 4.8–5.6)
HDLC SERPL-MCNC: 40 MG/DL
LDLC SERPL CALC-MCNC: 111 MG/DL (ref 0–99)
POTASSIUM SERPL-SCNC: 4.1 MMOL/L (ref 3.5–5.2)
PROT SERPL-MCNC: 6.6 G/DL (ref 6–8.5)
SODIUM SERPL-SCNC: 142 MMOL/L (ref 134–144)
TRIGL SERPL-MCNC: 202 MG/DL (ref 0–149)
TSH SERPL DL<=0.005 MIU/L-ACNC: 2.26 UIU/ML (ref 0.45–4.5)
VLDLC SERPL CALC-MCNC: 40 MG/DL (ref 5–40)

## 2020-10-14 DIAGNOSIS — R60.0 PEDAL EDEMA: ICD-10-CM

## 2020-10-14 DIAGNOSIS — I10 ESSENTIAL HYPERTENSION: ICD-10-CM

## 2020-10-14 RX ORDER — BUMETANIDE 1 MG/1
1 TABLET ORAL DAILY
Qty: 90 TABLET | Refills: 3 | Status: SHIPPED | OUTPATIENT
Start: 2020-10-14 | End: 2021-12-29 | Stop reason: SDUPTHER

## 2020-10-14 RX ORDER — LISINOPRIL 40 MG/1
40 TABLET ORAL DAILY
Qty: 90 TABLET | Refills: 3 | Status: SHIPPED | OUTPATIENT
Start: 2020-10-14 | End: 2021-10-14 | Stop reason: SDUPTHER

## 2021-02-12 ENCOUNTER — OFFICE VISIT (OUTPATIENT)
Dept: FAMILY MEDICINE CLINIC | Facility: CLINIC | Age: 81
End: 2021-02-12

## 2021-02-12 VITALS
HEART RATE: 80 BPM | OXYGEN SATURATION: 90 % | TEMPERATURE: 97.1 F | HEIGHT: 64 IN | DIASTOLIC BLOOD PRESSURE: 70 MMHG | SYSTOLIC BLOOD PRESSURE: 135 MMHG | WEIGHT: 226.6 LBS | BODY MASS INDEX: 38.68 KG/M2

## 2021-02-12 DIAGNOSIS — L72.3 SEBACEOUS CYST: ICD-10-CM

## 2021-02-12 DIAGNOSIS — E11.65 TYPE 2 DIABETES MELLITUS WITH HYPERGLYCEMIA, WITHOUT LONG-TERM CURRENT USE OF INSULIN (HCC): ICD-10-CM

## 2021-02-12 DIAGNOSIS — Z78.0 POSTMENOPAUSAL: ICD-10-CM

## 2021-02-12 DIAGNOSIS — L57.0 ACTINIC KERATOSIS OF RIGHT TEMPLE: ICD-10-CM

## 2021-02-12 DIAGNOSIS — I10 ESSENTIAL HYPERTENSION: Primary | ICD-10-CM

## 2021-02-12 DIAGNOSIS — M81.6 LOCALIZED OSTEOPOROSIS WITHOUT CURRENT PATHOLOGICAL FRACTURE: ICD-10-CM

## 2021-02-12 DIAGNOSIS — L65.9 HAIR LOSS: ICD-10-CM

## 2021-02-12 PROCEDURE — 99214 OFFICE O/P EST MOD 30 MIN: CPT | Performed by: FAMILY MEDICINE

## 2021-02-12 RX ORDER — IMIQUIMOD 12.5 MG/.25G
CREAM TOPICAL 3 TIMES WEEKLY
Qty: 12 PACKET | Refills: 0 | Status: SHIPPED | OUTPATIENT
Start: 2021-02-12

## 2021-02-12 NOTE — PROGRESS NOTES
"Subjective   Neida Hernandez is a 81 y.o. female.   Chief Complaint   Patient presents with   • Hypertension       History of Present Illness   Patient is here for a 6month f/u htn. Patient doesn't check it very often at home, she checked it yesterday and it was 165/75.  Patient also states she has a spot on her left humerus, a crusty spot on her right ear, and a spot on her back she would like looked at.    HTN- 165 / 75 at home.  Tolerating her blood pressure medicines without side effects.    Diabetes type 2-A1c 6 months ago was 7.1%.  She does not take any medicines for this.    HLD - lipids 6 months ago were excellent.  She does not take a statin.    She has a few moles she wants me to look at.    She has a small flaky area on her right temple that occasionally flakes off.  She has a similar area over in her left eye brow.  She has a very small verrucoid lesion which appears to be a very small seborrheic keratosis over her left elbow.  She also has a lump on her back which is always there.  She states sometimes her daughter squeezes it and gets material out of it.  It is flat right now. It's not red or tender.    She has an ongoing complaint of general thinning of her hair.    Patient Active Problem List    Diagnosis Date Noted   • Localized osteoporosis without current pathological fracture 02/12/2021   • Muscle spasm of back 09/09/2019   • Allergic rhinitis 10/16/2018   • Dizziness 07/02/2018   • Back pain 04/03/2018   • Chest discomfort 04/03/2018   • Cough 04/03/2018   • Other problems related to lifestyle 12/18/2017   • Postmenopausal 12/18/2017   • Screening for thyroid disorder 05/24/2017   • Cramp of extremity 01/25/2017   • Visit for screening mammogram 07/29/2016   • Sjogren's syndrome (CMS/HCC) 01/20/2016   • Degenerative arthritis 10/21/2015   • Diabetes mellitus, type II (CMS/HCC) 10/21/2015     Note Last Updated: 8/12/2020     \"borderline\" - diagnosed years ago     • Dyslipidemia 10/21/2015   • " Essential hypertension 10/21/2015   • Hernia of anterior abdominal wall 10/21/2015   • History of renal calculi 10/21/2015   • Obesity with body mass index 30 or greater 10/21/2015   • Pedal edema 10/21/2015   • Sleep apnea 10/21/2015     Note Last Updated: 8/12/2020     Uses CPAP with Oxygen at night only- got hypoxic during knee surgery             Past Surgical History:   Procedure Laterality Date   • CARDIAC CATHETERIZATION  06/2013   • REPLACEMENT TOTAL KNEE Left 2013   • VENTRAL HERNIA REPAIR      x 3      Current Outpatient Medications on File Prior to Visit   Medication Sig   • albuterol sulfate  (90 Base) MCG/ACT inhaler Inhale 2 puffs Every 4 (Four) Hours As Needed.   • bumetanide (BUMEX) 1 MG tablet Take 1 tablet by mouth Daily.   • Cyanocobalamin (B-12) 1000 MCG tablet controlled-release Take 1 tablet by mouth Daily.   • cyclobenzaprine (FLEXERIL) 5 MG tablet Take 1 tablet by mouth every night at bedtime.   • fluticasone (FLONASE) 50 MCG/ACT nasal spray 2 sprays into the nostril(s) as directed by provider Daily.   • lisinopril (PRINIVIL,ZESTRIL) 40 MG tablet Take 1 tablet by mouth Daily.   • LUTEIN PO Take 1 capsule by mouth Daily.     No current facility-administered medications on file prior to visit.      Allergies   Allergen Reactions   • Codeine GI Intolerance     Social History     Socioeconomic History   • Marital status:      Spouse name: Not on file   • Number of children: Not on file   • Years of education: Not on file   • Highest education level: Not on file   Tobacco Use   • Smoking status: Never Smoker   • Smokeless tobacco: Never Used   Substance and Sexual Activity   • Alcohol use: No     Frequency: Never   • Drug use: No     Family History   Problem Relation Age of Onset   • Hypertension Mother    • Heart disease Mother    • Stroke Mother    • Hypertension Father    • Heart disease Father    • No Known Problems Sister    • No Known Problems Daughter    • Kidney disease Son   "  • No Known Problems Sister    • No Known Problems Sister    • No Known Problems Sister    • No Known Problems Son    • No Known Problems Son        Review of Systems    Objective   /70 (BP Location: Right arm, Patient Position: Sitting, Cuff Size: Adult)   Pulse 80   Temp 97.1 °F (36.2 °C) (Infrared)   Ht 162.6 cm (64.02\")   Wt 103 kg (226 lb 9.6 oz)   LMP  (LMP Unknown)   SpO2 90%   BMI 38.88 kg/m²   Physical Exam  Constitutional:       Appearance: She is well-developed.      Comments: Wearing a face mask     HENT:      Head: Normocephalic and atraumatic.      Comments: Small, 3 to 4 mm flaky, erythematous patch over her right temple with a similar lesion in the hair of the left eyebrow.    Eyes:      Conjunctiva/sclera: Conjunctivae normal.   Neck:      Musculoskeletal: Normal range of motion.   Cardiovascular:      Rate and Rhythm: Normal rate.   Pulmonary:      Effort: Pulmonary effort is normal.   Musculoskeletal: Normal range of motion.      Comments: Rather large 6 or 7 cm masslike nonmobile fullness on her left upper back.  There is a darkish closed poor in the center of this area.  It is consistent with a large sebaceous cyst.   Skin:     General: Skin is warm and dry.      Findings: No rash.      Comments: Small seborrheic keratosis on left elbow.   Neurological:      Mental Status: She is alert and oriented to person, place, and time.   Psychiatric:         Behavior: Behavior normal.           No visits with results within 4 Month(s) from this visit.   Latest known visit with results is:   Office Visit on 08/12/2020   Component Date Value Ref Range Status   • Total Cholesterol 08/12/2020 191  100 - 199 mg/dL Final   • Triglycerides 08/12/2020 202* 0 - 149 mg/dL Final   • HDL Cholesterol 08/12/2020 40  >39 mg/dL Final   • VLDL Cholesterol 08/12/2020 40  5 - 40 mg/dL Final   • LDL Cholesterol  08/12/2020 111* 0 - 99 mg/dL Final   • Hemoglobin A1C 08/12/2020 7.1* 4.8 - 5.6 % Final    Comment:  "         Prediabetes: 5.7 - 6.4           Diabetes: >6.4           Glycemic control for adults with diabetes: <7.0     • TSH 08/12/2020 2.260  0.450 - 4.500 uIU/mL Final   • Glucose 08/12/2020 149* 65 - 99 mg/dL Final   • BUN 08/12/2020 21  8 - 27 mg/dL Final   • Creatinine 08/12/2020 0.94  0.57 - 1.00 mg/dL Final   • eGFR Non African Am 08/12/2020 57* >59 mL/min/1.73 Final   • eGFR African Am 08/12/2020 66  >59 mL/min/1.73 Final   • BUN/Creatinine Ratio 08/12/2020 22  12 - 28 Final   • Sodium 08/12/2020 142  134 - 144 mmol/L Final   • Potassium 08/12/2020 4.1  3.5 - 5.2 mmol/L Final   • Chloride 08/12/2020 101  96 - 106 mmol/L Final   • Total CO2 08/12/2020 27  20 - 29 mmol/L Final   • Calcium 08/12/2020 9.1  8.7 - 10.3 mg/dL Final   • Total Protein 08/12/2020 6.6  6.0 - 8.5 g/dL Final   • Albumin 08/12/2020 4.0  3.7 - 4.7 g/dL Final   • Globulin 08/12/2020 2.6  1.5 - 4.5 g/dL Final   • A/G Ratio 08/12/2020 1.5  1.2 - 2.2 Final   • Total Bilirubin 08/12/2020 0.3  0.0 - 1.2 mg/dL Final   • Alkaline Phosphatase 08/12/2020 63  39 - 117 IU/L Final   • AST (SGOT) 08/12/2020 15  0 - 40 IU/L Final   • ALT (SGPT) 08/12/2020 14  0 - 32 IU/L Final         Assessment/Plan   Diagnoses and all orders for this visit:    1. Essential hypertension (Primary)    2. Type 2 diabetes mellitus with hyperglycemia, without long-term current use of insulin (CMS/[facility])  -     Comprehensive Metabolic Panel  -     Hemoglobin A1c  -     MicroAlbumin, Urine, Random - Urine, Clean Catch    3. Actinic keratosis of right temple  -     imiquimod (Aldara) 5 % cream; Apply  topically to the appropriate area as directed 3 (Three) Times a Week.  Dispense: 12 packet; Refill: 0    4. Sebaceous cyst    5. Hair loss  -     TSH    6. Postmenopausal    7. Localized osteoporosis without current pathological fracture  -     DEXA Bone Density Axial; Future    Blood pressure is for the most part well controlled.  At 81 years old, I consider her blood pressure very  good.  We will do labs today to monitor her renal function, diabetic control and we will check a TSH because of her complaint of hair loss.  She has had a DEXA scan but she does not remember when it was.  She is open to doing that again.  We will treat the actinic keratosis on her temple and in her left eyebrow with Aldara cream 3 nights a week for 1 month.  We discussed the sebaceous cyst on her back.  Right now it is not bothering her.  The area appears quite large and I think to remove all of the capsule wall would require a very large incision.  I think it is best simply to monitor it at the present time.  She is okay with that.  We will follow-up with her when the results of her tests are back.  Recheck here in 6 months to follow her blood pressure.           Return in about 6 months (around 8/12/2021).    Call with any problems or concerns before next visit

## 2021-02-13 LAB
ALBUMIN SERPL-MCNC: 4.3 G/DL (ref 3.6–4.6)
ALBUMIN/GLOB SERPL: 1.6 {RATIO} (ref 1.2–2.2)
ALP SERPL-CCNC: 71 IU/L (ref 39–117)
ALT SERPL-CCNC: 15 IU/L (ref 0–32)
AST SERPL-CCNC: 17 IU/L (ref 0–40)
BILIRUB SERPL-MCNC: 0.3 MG/DL (ref 0–1.2)
BUN SERPL-MCNC: 21 MG/DL (ref 8–27)
BUN/CREAT SERPL: 23 (ref 12–28)
CALCIUM SERPL-MCNC: 9.5 MG/DL (ref 8.7–10.3)
CHLORIDE SERPL-SCNC: 98 MMOL/L (ref 96–106)
CO2 SERPL-SCNC: 27 MMOL/L (ref 20–29)
CREAT SERPL-MCNC: 0.93 MG/DL (ref 0.57–1)
GLOBULIN SER CALC-MCNC: 2.7 G/DL (ref 1.5–4.5)
GLUCOSE SERPL-MCNC: 138 MG/DL (ref 65–99)
HBA1C MFR BLD: 7.4 % (ref 4.8–5.6)
MICROALBUMIN UR-MCNC: 11 UG/ML
POTASSIUM SERPL-SCNC: 4.9 MMOL/L (ref 3.5–5.2)
PROT SERPL-MCNC: 7 G/DL (ref 6–8.5)
SODIUM SERPL-SCNC: 143 MMOL/L (ref 134–144)
TSH SERPL DL<=0.005 MIU/L-ACNC: 2.18 UIU/ML (ref 0.45–4.5)

## 2021-02-16 ENCOUNTER — TELEPHONE (OUTPATIENT)
Dept: FAMILY MEDICINE CLINIC | Facility: CLINIC | Age: 81
End: 2021-02-16

## 2021-02-16 NOTE — TELEPHONE ENCOUNTER
MOE, PATIENT'S DAUGHTER CALLED AND WANTED TO ASK QUESTIONS ABOUT HER MOTHERS DIAGNOSIS OF DIABETES. SHE SAW HER PAPERWORK THAT HER A1C WAS 7.4 AND HER DIAGNOSIS IS DIABETES. PATIENT STATES SHE WAS NEVER TOLD SHE HAS DIABETES. PLEASE CALL MOE [phone]

## 2021-02-24 ENCOUNTER — TELEPHONE (OUTPATIENT)
Dept: FAMILY MEDICINE CLINIC | Facility: CLINIC | Age: 81
End: 2021-02-24

## 2021-02-24 NOTE — TELEPHONE ENCOUNTER
PATIENTS DAUGHTER IS CALLING IN WITH THE ANSWERS TO THE QUESTIONS ABOUT THE CPAP MACHINE.      DOCTOR DA SILVA IS WHO PRESCRIBED THE MACHINE.      SHE GOT IN June OF 2013    LAST TIME SHE SEEN DOCTOR AVINASH WAS 05/01/2019

## 2021-08-18 ENCOUNTER — OFFICE VISIT (OUTPATIENT)
Dept: FAMILY MEDICINE CLINIC | Facility: CLINIC | Age: 81
End: 2021-08-18

## 2021-08-18 VITALS
TEMPERATURE: 97.8 F | SYSTOLIC BLOOD PRESSURE: 108 MMHG | BODY MASS INDEX: 39.09 KG/M2 | OXYGEN SATURATION: 94 % | HEART RATE: 72 BPM | WEIGHT: 229 LBS | DIASTOLIC BLOOD PRESSURE: 62 MMHG | HEIGHT: 64 IN

## 2021-08-18 DIAGNOSIS — E11.65 TYPE 2 DIABETES MELLITUS WITH HYPERGLYCEMIA, WITHOUT LONG-TERM CURRENT USE OF INSULIN (HCC): ICD-10-CM

## 2021-08-18 DIAGNOSIS — Z20.822 CLOSE EXPOSURE TO COVID-19 VIRUS: Primary | ICD-10-CM

## 2021-08-18 DIAGNOSIS — E78.5 DYSLIPIDEMIA: ICD-10-CM

## 2021-08-18 DIAGNOSIS — I10 ESSENTIAL HYPERTENSION: ICD-10-CM

## 2021-08-18 PROCEDURE — 99214 OFFICE O/P EST MOD 30 MIN: CPT | Performed by: FAMILY MEDICINE

## 2021-08-18 NOTE — PROGRESS NOTES
Subjective   Neida Hernandez is a 81 y.o. female.   Chief Complaint   Patient presents with   • Hypertension       History of Present Illness   Patient presents today to follow up on her HTN. She denies checking it at home. She was also exposed to COVID recently, so I swab tested her for that. She denies having any sxs.  Above reviewed and verified.   Presents today for follow-up on chronic medical problems per active problem list as below.  I last saw her about 6 months ago.    HTN-  Tolerating her blood pressure medicines without side effects. Blood pressure in the office today is very good at 108/64. No dizziness or lightheadedness when she stands up.     Diabetes type 2-A1c 6 months ago was 7.4%.  She does not take any medicines for this.     HLD - lipids 12 months ago were excellent.  She does not take a statin.     She recently had a [facility] scan that she wants to talk about. Her carotid stenosis screen suggested mild stenosis only.  Peripheral arterial screen was of low risk, abdominal aortic aneurysm screen was negative showing abdominal aorta less than 3 cm. Her EKG did not show atrial fibrillation.    13 days ago was with her sister.  Stayed all night.  8 days ago her sister tested positive.  Multiple people at her sisters [employer] tested +, too.  Neida is not having any symptoms or so. No loss of taste or smell. No cough or shortness of breath. No fevers.  Patient Active Problem List    Diagnosis Date Noted   • Localized osteoporosis without current pathological fracture 02/12/2021   • Muscle spasm of back 09/09/2019   • Allergic rhinitis 10/16/2018   • Dizziness 07/02/2018   • Back pain 04/03/2018   • Chest discomfort 04/03/2018   • Cough 04/03/2018   • Other problems related to lifestyle 12/18/2017   • Postmenopausal 12/18/2017   • Screening for thyroid disorder 05/24/2017   • Cramp of extremity 01/25/2017   • Visit for screening mammogram 07/29/2016   • Sjogren's syndrome (CMS/HCC) 01/20/2016   •  "Degenerative arthritis 10/21/2015   • Diabetes mellitus, type II (CMS/HCC) 10/21/2015     Note Last Updated: 8/12/2020     \"borderline\" - diagnosed years ago     • Dyslipidemia 10/21/2015   • Essential hypertension 10/21/2015   • Hernia of anterior abdominal wall 10/21/2015   • History of renal calculi 10/21/2015   • Obesity with body mass index 30 or greater 10/21/2015   • Pedal edema 10/21/2015   • Sleep apnea 10/21/2015     Note Last Updated: 8/12/2020     Uses CPAP with Oxygen at night only- got hypoxic during knee surgery             Past Surgical History:   Procedure Laterality Date   • CARDIAC CATHETERIZATION  06/2013   • REPLACEMENT TOTAL KNEE Left 2013   • VENTRAL HERNIA REPAIR      x 3      Current Outpatient Medications on File Prior to Visit   Medication Sig   • albuterol sulfate  (90 Base) MCG/ACT inhaler Inhale 2 puffs Every 4 (Four) Hours As Needed.   • bumetanide (BUMEX) 1 MG tablet Take 1 tablet by mouth Daily.   • Cyanocobalamin (B-12) 1000 MCG tablet controlled-release Take 1 tablet by mouth Daily.   • cyclobenzaprine (FLEXERIL) 5 MG tablet Take 1 tablet by mouth every night at bedtime.   • fluticasone (FLONASE) 50 MCG/ACT nasal spray 2 sprays into the nostril(s) as directed by provider Daily.   • imiquimod (Aldara) 5 % cream Apply  topically to the appropriate area as directed 3 (Three) Times a Week.   • lisinopril (PRINIVIL,ZESTRIL) 40 MG tablet Take 1 tablet by mouth Daily.   • LUTEIN PO Take 1 capsule by mouth Daily.     No current facility-administered medications on file prior to visit.     Allergies   Allergen Reactions   • Codeine GI Intolerance     Social History     Socioeconomic History   • Marital status:      Spouse name: Not on file   • Number of children: Not on file   • Years of education: Not on file   • Highest education level: Not on file   Tobacco Use   • Smoking status: Never Smoker   • Smokeless tobacco: Never Used   Substance and Sexual Activity   • Alcohol " "use: No   • Drug use: No     Family History   Problem Relation Age of Onset   • Hypertension Mother    • Heart disease Mother    • Stroke Mother    • Hypertension Father    • Heart disease Father    • No Known Problems Sister    • No Known Problems Daughter    • Kidney disease Son    • No Known Problems Sister    • No Known Problems Sister    • No Known Problems Sister    • No Known Problems Son    • No Known Problems Son        Review of Systems    Objective   /62 (BP Location: Left arm, Patient Position: Sitting, Cuff Size: Adult)   Pulse 72   Temp 97.8 °F (36.6 °C) (Oral)   Ht 162.9 cm (64.13\")   Wt 104 kg (229 lb)   LMP  (LMP Unknown)   SpO2 94%   BMI 39.14 kg/m²   Physical Exam  Constitutional:       General: She is not in acute distress.     Appearance: She is well-developed.      Comments: Wearing a face mask  Elderly white female. Looks younger than her known age of 81 years   HENT:      Head: Normocephalic and atraumatic.   Eyes:      Conjunctiva/sclera: Conjunctivae normal.   Cardiovascular:      Rate and Rhythm: Normal rate and regular rhythm.      Heart sounds: No murmur heard.     Pulmonary:      Effort: Pulmonary effort is normal. No respiratory distress.      Breath sounds: Normal breath sounds. No wheezing.   Musculoskeletal:         General: Normal range of motion.      Cervical back: Normal range of motion.      Right lower leg: No edema.      Left lower leg: No edema.   Skin:     General: Skin is warm and dry.      Findings: No rash.   Neurological:      Mental Status: She is alert and oriented to person, place, and time.   Psychiatric:         Behavior: Behavior normal.           No visits with results within 4 Month(s) from this visit.   Latest known visit with results is:   Office Visit on 02/12/2021   Component Date Value Ref Range Status   • Glucose 02/12/2021 138* 65 - 99 mg/dL Final   • BUN 02/12/2021 21  8 - 27 mg/dL Final   • Creatinine 02/12/2021 0.93  0.57 - 1.00 mg/dL " Final   • eGFR Non  Am 02/12/2021 58* >59 mL/min/1.73 Final   • eGFR African Am 02/12/2021 67  >59 mL/min/1.73 Final   • BUN/Creatinine Ratio 02/12/2021 23  12 - 28 Final   • Sodium 02/12/2021 143  134 - 144 mmol/L Final   • Potassium 02/12/2021 4.9  3.5 - 5.2 mmol/L Final   • Chloride 02/12/2021 98  96 - 106 mmol/L Final   • Total CO2 02/12/2021 27  20 - 29 mmol/L Final   • Calcium 02/12/2021 9.5  8.7 - 10.3 mg/dL Final   • Total Protein 02/12/2021 7.0  6.0 - 8.5 g/dL Final   • Albumin 02/12/2021 4.3  3.6 - 4.6 g/dL Final   • Globulin 02/12/2021 2.7  1.5 - 4.5 g/dL Final   • A/G Ratio 02/12/2021 1.6  1.2 - 2.2 Final   • Total Bilirubin 02/12/2021 0.3  0.0 - 1.2 mg/dL Final   • Alkaline Phosphatase 02/12/2021 71  39 - 117 IU/L Final   • AST (SGOT) 02/12/2021 17  0 - 40 IU/L Final   • ALT (SGPT) 02/12/2021 15  0 - 32 IU/L Final   • Hemoglobin A1C 02/12/2021 7.4* 4.8 - 5.6 % Final    Comment:          Prediabetes: 5.7 - 6.4           Diabetes: >6.4           Glycemic control for adults with diabetes: <7.0     • TSH 02/12/2021 2.180  0.450 - 4.500 uIU/mL Final   • Microalbumin, Urine 02/12/2021 11.0  Not Estab. ug/mL Final         Assessment/Plan   Diagnoses and all orders for this visit:    1. Close exposure to COVID-19 virus (Primary)  -     COVID-19,[facility] ROUTINE, NP/OP SWAB IN TRANSPORT MEDIA OR ESWAB 72 HR TAT - Swab, Nasopharynx    2. Essential hypertension  -     CBC & Differential  -     Comprehensive Metabolic Panel    3. Type 2 diabetes mellitus with hyperglycemia, without long-term current use of insulin (CMS/HCC)  -     Hemoglobin A1c    4. Dyslipidemia  -     Lipid Panel    Exam today is unremarkable.  It sounds like she has had a relatively negative vascular screening. Blood pressure is doing well. Continue current medicines at current doses.  We will check labs as above to screen her for anemia, kidney or liver disease. We will follow a lipid panel because of her hyperlipidemia.  We will also  get an A1c because of her currently diet-controlled diabetes. As long as her A1c stays in the mid 7 or lower range, it will ultimately be safer not to treat her. If her A1c goes above 8, then we will need to reconsider.  We will follow up with her when the results of her test including her Covid test are back.  She is now almost 2 weeks out from her suspected exposure. I have advised her to go ahead and try to quarantine away from people, wear a mask whenever she is around other people and await the results of her Covid test.  We will plan to see her back in 6 months unless earlier follow-up is warranted based on any test results or new symptoms that develop.         Call with any problems or concerns before next visit  Return in about 6 months (around 2/18/2022).      Much of this report is an electronic transcription of spoken language to printed text using Dragon dictation software.  As such, the subtleties and finesse of spoken language may permit erroneous, or at times, nonsensical words or phrases to be inadvertently transcribed; thus changes may be made at a later date to rectify these errors.

## 2021-08-19 LAB
ALBUMIN SERPL-MCNC: 4 G/DL (ref 3.6–4.6)
ALBUMIN/GLOB SERPL: 1.5 {RATIO} (ref 1.2–2.2)
ALP SERPL-CCNC: 67 IU/L (ref 48–121)
ALT SERPL-CCNC: 16 IU/L (ref 0–32)
AST SERPL-CCNC: 18 IU/L (ref 0–40)
BASOPHILS # BLD AUTO: 0.1 X10E3/UL (ref 0–0.2)
BASOPHILS NFR BLD AUTO: 1 %
BILIRUB SERPL-MCNC: 0.3 MG/DL (ref 0–1.2)
BUN SERPL-MCNC: 20 MG/DL (ref 8–27)
BUN/CREAT SERPL: 23 (ref 12–28)
CALCIUM SERPL-MCNC: 8.9 MG/DL (ref 8.7–10.3)
CHLORIDE SERPL-SCNC: 100 MMOL/L (ref 96–106)
CHOLEST SERPL-MCNC: 221 MG/DL (ref 100–199)
CO2 SERPL-SCNC: 27 MMOL/L (ref 20–29)
CREAT SERPL-MCNC: 0.88 MG/DL (ref 0.57–1)
EOSINOPHIL # BLD AUTO: 0.2 X10E3/UL (ref 0–0.4)
EOSINOPHIL NFR BLD AUTO: 3 %
ERYTHROCYTE [DISTWIDTH] IN BLOOD BY AUTOMATED COUNT: 12.6 % (ref 11.7–15.4)
GLOBULIN SER CALC-MCNC: 2.7 G/DL (ref 1.5–4.5)
GLUCOSE SERPL-MCNC: 129 MG/DL (ref 65–99)
HBA1C MFR BLD: 7.7 % (ref 4.8–5.6)
HCT VFR BLD AUTO: 39.3 % (ref 34–46.6)
HDLC SERPL-MCNC: 40 MG/DL
HGB BLD-MCNC: 13.1 G/DL (ref 11.1–15.9)
IMM GRANULOCYTES # BLD AUTO: 0 X10E3/UL (ref 0–0.1)
IMM GRANULOCYTES NFR BLD AUTO: 0 %
LABCORP SARS-COV-2, NAA 2 DAY TAT: NORMAL
LDLC SERPL CALC-MCNC: 132 MG/DL (ref 0–99)
LYMPHOCYTES # BLD AUTO: 2.5 X10E3/UL (ref 0.7–3.1)
LYMPHOCYTES NFR BLD AUTO: 34 %
MCH RBC QN AUTO: 31.5 PG (ref 26.6–33)
MCHC RBC AUTO-ENTMCNC: 33.3 G/DL (ref 31.5–35.7)
MCV RBC AUTO: 95 FL (ref 79–97)
MONOCYTES # BLD AUTO: 0.6 X10E3/UL (ref 0.1–0.9)
MONOCYTES NFR BLD AUTO: 8 %
NEUTROPHILS # BLD AUTO: 4 X10E3/UL (ref 1.4–7)
NEUTROPHILS NFR BLD AUTO: 54 %
PLATELET # BLD AUTO: 220 X10E3/UL (ref 150–450)
POTASSIUM SERPL-SCNC: 4.4 MMOL/L (ref 3.5–5.2)
PROT SERPL-MCNC: 6.7 G/DL (ref 6–8.5)
RBC # BLD AUTO: 4.16 X10E6/UL (ref 3.77–5.28)
SARS-COV-2 RNA RESP QL NAA+PROBE: NOT DETECTED
SODIUM SERPL-SCNC: 141 MMOL/L (ref 134–144)
TRIGL SERPL-MCNC: 275 MG/DL (ref 0–149)
VLDLC SERPL CALC-MCNC: 49 MG/DL (ref 5–40)
WBC # BLD AUTO: 7.4 X10E3/UL (ref 3.4–10.8)

## 2021-08-20 ENCOUNTER — TELEPHONE (OUTPATIENT)
Dept: FAMILY MEDICINE CLINIC | Facility: CLINIC | Age: 81
End: 2021-08-20

## 2021-08-20 NOTE — TELEPHONE ENCOUNTER
Caller: Neida Hernandez    Relationship: Self    Best call back number: [phone]    Caller requesting test results: PATIENT     What test was performed: COVID-19 TESTING AND BLOOD WORK     When was the test performed: 08/18/2021    Where was the test performed: [facility]    Additional notes: THE PATIENT STATES THAT SHE HAS HAD A COVID-19 TEST AND HAS HAD SOME BLOOD WORK DONE AND WOULD LIKE TO KNOW THE RESULTS OF THE TEST. THE PATIENT STATES THAT SHE WOULD ALSO LIKE THE APPOINTMENT NOTES FROM HER VISIT 08/18/2021

## 2021-08-20 NOTE — TELEPHONE ENCOUNTER
Clarissa, it looks like you already gave her the results of these tests per the messages in the chart.  I assume that this part of the message is done.  If she would like a copy of her office note, we can print it and mail it to her or she can sign up for my chart and she can see all of the office visit notes!  Thanks

## 2021-10-14 ENCOUNTER — TELEPHONE (OUTPATIENT)
Dept: FAMILY MEDICINE CLINIC | Facility: CLINIC | Age: 81
End: 2021-10-14

## 2021-10-14 RX ORDER — LISINOPRIL 40 MG/1
40 TABLET ORAL DAILY
Qty: 90 TABLET | Refills: 1 | Status: SHIPPED | OUTPATIENT
Start: 2021-10-14 | End: 2022-04-07 | Stop reason: SDUPTHER

## 2021-10-14 NOTE — TELEPHONE ENCOUNTER
Caller: Neida Hernandez    Relationship: Self      Medication requested (name and dosage): lisinopril (PRINIVIL,ZESTRIL) 40 MG tablet    Pharmacy where request should be sent:[facility] [address] [place] [address] - [phone]  - [phone] [place] [phone]    Additional details provided by patient: PATIENT IS OUT.    Best call back number: [phone]    Does the patient have less than a 3 day supply:  [x] Yes  [] No    Larry Palacios Rep   10/14/21 08:29 EDT

## 2021-10-19 ENCOUNTER — TELEPHONE (OUTPATIENT)
Dept: FAMILY MEDICINE CLINIC | Facility: CLINIC | Age: 81
End: 2021-10-19

## 2021-10-19 RX ORDER — AZITHROMYCIN 250 MG/1
TABLET, FILM COATED ORAL
Qty: 6 TABLET | Refills: 0 | Status: SHIPPED | OUTPATIENT
Start: 2021-10-19 | End: 2022-03-24

## 2021-10-19 NOTE — TELEPHONE ENCOUNTER
Caller: Neida Hernandez    Relationship: Self    Best call back number: [phone]    What medication are you requesting: ZPAC    What are your current symptoms: SINUS ISSUES    How long have you been experiencing symptoms:2 WEEKS    Have you had these symptoms before:    [x] Yes  [] No    Have you been treated for these symptoms before:   [x] Yes  [] No    If a prescription is needed, what is your preferred pharmacy and phone number: [facility] [address] [facility] [address] [facility] [phone] [facility] [phone]      Additional notes:PATIENT STATED THAT SHE THINKS SHE HAS A SINUS INFECTION AND WOULD LIKE DR. STERLING TO CALL IN A Z PACK FOR HER TO HELP WITH THIS MATTER ASAP.

## 2021-12-29 DIAGNOSIS — R60.0 PEDAL EDEMA: ICD-10-CM

## 2021-12-29 DIAGNOSIS — I10 ESSENTIAL HYPERTENSION: ICD-10-CM

## 2021-12-29 RX ORDER — BUMETANIDE 1 MG/1
1 TABLET ORAL DAILY
Qty: 90 TABLET | Refills: 3 | Status: SHIPPED | OUTPATIENT
Start: 2021-12-29 | End: 2022-04-07 | Stop reason: SDUPTHER

## 2021-12-29 NOTE — TELEPHONE ENCOUNTER
Caller: Neida Hernandez    Relationship: Self    Best call back number: [phone]    Requested Prescriptions:   Requested Prescriptions     Pending Prescriptions Disp Refills   • bumetanide (BUMEX) 1 MG tablet 90 tablet 3     Sig: Take 1 tablet by mouth Daily.        Pharmacy where request should be sent:  [facility] [address] [place] [address] [address] [place] [address] - [phone]  - [phone] [place] [phone]    Does the patient have less than a 3 day supply:  [x] Yes  [] No    Larry Rich Rep   12/29/21 15:04 EST

## 2022-02-18 ENCOUNTER — OFFICE VISIT (OUTPATIENT)
Dept: FAMILY MEDICINE CLINIC | Facility: CLINIC | Age: 82
End: 2022-02-18

## 2022-02-18 VITALS
DIASTOLIC BLOOD PRESSURE: 88 MMHG | TEMPERATURE: 98.2 F | BODY MASS INDEX: 38.93 KG/M2 | SYSTOLIC BLOOD PRESSURE: 152 MMHG | HEART RATE: 68 BPM | OXYGEN SATURATION: 92 % | HEIGHT: 64 IN | WEIGHT: 228 LBS

## 2022-02-18 DIAGNOSIS — E11.65 TYPE 2 DIABETES MELLITUS WITH HYPERGLYCEMIA, WITHOUT LONG-TERM CURRENT USE OF INSULIN: Primary | ICD-10-CM

## 2022-02-18 DIAGNOSIS — E78.5 DYSLIPIDEMIA: ICD-10-CM

## 2022-02-18 DIAGNOSIS — I10 ESSENTIAL HYPERTENSION: ICD-10-CM

## 2022-02-18 DIAGNOSIS — N39.46 MIXED STRESS AND URGE URINARY INCONTINENCE: ICD-10-CM

## 2022-02-18 PROCEDURE — 99214 OFFICE O/P EST MOD 30 MIN: CPT | Performed by: FAMILY MEDICINE

## 2022-02-18 RX ORDER — TOLTERODINE 2 MG/1
2 CAPSULE, EXTENDED RELEASE ORAL DAILY
Qty: 30 CAPSULE | Refills: 5 | Status: SHIPPED | OUTPATIENT
Start: 2022-02-18

## 2022-02-18 NOTE — PROGRESS NOTES
"Subjective   Neida Hernandez is a 82 y.o. female.   Chief Complaint   Patient presents with   • Hypertension       History of Present Illness   Presents to the office today HTN. It's elevated today at 218/92. She is fasting if you want any labwork.  Above reviewed.  Presents to the office today to follow-up on chronic medical problems per active problem list as below.    Diabetes type 2-does not take any medications, but her A1c has been trending upward.  Last A1c 6 months ago was 7.7%.    HTN-we rechecked her blood pressure today with a larger cuff and she was only in the 150s.    HLD-last lipid panel was in August of last year and her LDL is crept up a little bit to 132.  She does not take a statin and did not want to start 1.    New complaint today is that of urinary incontinence.  This will be triggered if she coughs, climb stairs, or laughs.  She will also have some leaking if she is hurrying to the restroom.    Patient Active Problem List    Diagnosis Date Noted   • Localized osteoporosis without current pathological fracture 02/12/2021   • Muscle spasm of back 09/09/2019   • Allergic rhinitis 10/16/2018   • Dizziness 07/02/2018   • Back pain 04/03/2018   • Chest discomfort 04/03/2018   • Cough 04/03/2018   • Other problems related to lifestyle 12/18/2017   • Postmenopausal 12/18/2017   • Screening for thyroid disorder 05/24/2017   • Cramp of extremity 01/25/2017   • Visit for screening mammogram 07/29/2016   • Sjogren's syndrome (HCC) 01/20/2016   • Degenerative arthritis 10/21/2015   • Diabetes mellitus, type II (HCC) 10/21/2015     Note Last Updated: 8/12/2020     \"borderline\" - diagnosed years ago     • Dyslipidemia 10/21/2015   • Essential hypertension 10/21/2015   • Hernia of anterior abdominal wall 10/21/2015   • History of renal calculi 10/21/2015   • Obesity with body mass index 30 or greater 10/21/2015   • Pedal edema 10/21/2015   • Sleep apnea 10/21/2015     Note Last Updated: 8/12/2020     Uses CPAP " with Oxygen at night only- got hypoxic during knee surgery             Past Surgical History:   Procedure Laterality Date   • CARDIAC CATHETERIZATION  06/2013   • REPLACEMENT TOTAL KNEE Left 2013   • VENTRAL HERNIA REPAIR      x 3      Current Outpatient Medications on File Prior to Visit   Medication Sig   • albuterol sulfate  (90 Base) MCG/ACT inhaler Inhale 2 puffs Every 4 (Four) Hours As Needed.   • azithromycin (Zithromax Z-Nicolas) 250 MG tablet Take 2 tablets by mouth on day 1, then 1 tablet daily on days 2-5   • bumetanide (BUMEX) 1 MG tablet Take 1 tablet by mouth Daily.   • Cyanocobalamin (B-12) 1000 MCG tablet controlled-release Take 1 tablet by mouth Daily.   • cyclobenzaprine (FLEXERIL) 5 MG tablet Take 1 tablet by mouth every night at bedtime.   • fluticasone (FLONASE) 50 MCG/ACT nasal spray 2 sprays into the nostril(s) as directed by provider Daily.   • imiquimod (Aldara) 5 % cream Apply  topically to the appropriate area as directed 3 (Three) Times a Week.   • lisinopril (PRINIVIL,ZESTRIL) 40 MG tablet Take 1 tablet by mouth Daily.   • LUTEIN PO Take 1 capsule by mouth Daily.     No current facility-administered medications on file prior to visit.     Allergies   Allergen Reactions   • Codeine GI Intolerance     Social History     Socioeconomic History   • Marital status:    Tobacco Use   • Smoking status: Never Smoker   • Smokeless tobacco: Never Used   Substance and Sexual Activity   • Alcohol use: No   • Drug use: No     Family History   Problem Relation Age of Onset   • Hypertension Mother    • Heart disease Mother    • Stroke Mother    • Hypertension Father    • Heart disease Father    • No Known Problems Sister    • No Known Problems Daughter    • Kidney disease Son    • No Known Problems Sister    • No Known Problems Sister    • No Known Problems Sister    • No Known Problems Son    • No Known Problems Son        Review of Systems    Objective   /88 (BP Location: Left arm,  "Patient Position: Sitting, Cuff Size: Large Adult)   Pulse 68   Temp 98.2 °F (36.8 °C) (Oral)   Ht 162.9 cm (64.13\")   Wt 103 kg (228 lb)   LMP  (LMP Unknown)   SpO2 92%   BMI 38.97 kg/m²   Physical Exam  Constitutional:       General: She is not in acute distress.     Appearance: She is well-developed. She is not toxic-appearing.      Comments: Wearing a face mask     HENT:      Head: Normocephalic and atraumatic.   Eyes:      Conjunctiva/sclera: Conjunctivae normal.   Cardiovascular:      Rate and Rhythm: Normal rate and regular rhythm.      Heart sounds: Normal heart sounds. No murmur heard.      Pulmonary:      Effort: Pulmonary effort is normal. No respiratory distress.      Breath sounds: Normal breath sounds.   Musculoskeletal:         General: Normal range of motion.      Cervical back: Normal range of motion.      Right lower leg: No edema.      Left lower leg: No edema.   Skin:     General: Skin is warm and dry.      Findings: No rash.   Neurological:      Mental Status: She is alert and oriented to person, place, and time.   Psychiatric:         Behavior: Behavior normal.           No visits with results within 4 Month(s) from this visit.   Latest known visit with results is:   Office Visit on 08/18/2021   Component Date Value Ref Range Status   • SARS-CoV-2, SAVANNAH 08/18/2021 Not Detected  Not Detected Final    Comment: This nucleic acid amplification test was developed and its performance  characteristics determined by [employer]. Nucleic acid  amplification tests include RT-PCR and TMA. This test has not been  FDA cleared or approved. This test has been authorized by FDA under  an Emergency Use Authorization (EUA). This test is only authorized  for the duration of time the declaration that circumstances exist  justifying the authorization of the emergency use of in vitro  diagnostic tests for detection of SARS-CoV-2 virus and/or diagnosis  of COVID-19 infection under section 564(b)(1) of " the Act, 21 U.S.C.  360bbb-3(b) (1), unless the authorization is terminated or revoked  sooner.  When diagnostic testing is negative, the possibility of a false  negative result should be considered in the context of a patient's  recent exposures and the presence of clinical signs and symptoms  consistent with COVID-19. An individual without symptoms of COVID-19  and who is not shedding SARS-CoV-2 virus wo                           uld expect to have a  negative (not detected) result in this assay.     • WBC 08/18/2021 7.4  3.4 - 10.8 x10E3/uL Final   • RBC 08/18/2021 4.16  3.77 - 5.28 x10E6/uL Final   • Hemoglobin 08/18/2021 13.1  11.1 - 15.9 g/dL Final   • Hematocrit 08/18/2021 39.3  34.0 - 46.6 % Final   • MCV 08/18/2021 95  79 - 97 fL Final   • MCH 08/18/2021 31.5  26.6 - 33.0 pg Final   • MCHC 08/18/2021 33.3  31.5 - 35.7 g/dL Final   • RDW 08/18/2021 12.6  11.7 - 15.4 % Final   • Platelets 08/18/2021 220  150 - 450 x10E3/uL Final   • Neutrophil Rel % 08/18/2021 54  Not Estab. % Final   • Lymphocyte Rel % 08/18/2021 34  Not Estab. % Final   • Monocyte Rel % 08/18/2021 8  Not Estab. % Final   • Eosinophil Rel % 08/18/2021 3  Not Estab. % Final   • Basophil Rel % 08/18/2021 1  Not Estab. % Final   • Neutrophils Absolute 08/18/2021 4.0  1.4 - 7.0 x10E3/uL Final   • Lymphocytes Absolute 08/18/2021 2.5  0.7 - 3.1 x10E3/uL Final   • Monocytes Absolute 08/18/2021 0.6  0.1 - 0.9 x10E3/uL Final   • Eosinophils Absolute 08/18/2021 0.2  0.0 - 0.4 x10E3/uL Final   • Basophils Absolute 08/18/2021 0.1  0.0 - 0.2 x10E3/uL Final   • Immature Granulocyte Rel % 08/18/2021 0  Not Estab. % Final   • Immature Grans Absolute 08/18/2021 0.0  0.0 - 0.1 x10E3/uL Final   • Glucose 08/18/2021 129* 65 - 99 mg/dL Final   • BUN 08/18/2021 20  8 - 27 mg/dL Final   • Creatinine 08/18/2021 0.88  0.57 - 1.00 mg/dL Final   • eGFR Non  Am 08/18/2021 62  >59 mL/min/1.73 Final   • eGFR African Am 08/18/2021 71  >59 mL/min/1.73 Final     Comment: **[facility] currently reports eGFR in compliance with the current**    recommendations of the National Kidney Foundation. [facility] will    update reporting as new guidelines are published from the NKF-ASN    Task force.     • BUN/Creatinine Ratio 08/18/2021 23  12 - 28 Final   • Sodium 08/18/2021 141  134 - 144 mmol/L Final   • Potassium 08/18/2021 4.4  3.5 - 5.2 mmol/L Final   • Chloride 08/18/2021 100  96 - 106 mmol/L Final   • Total CO2 08/18/2021 27  20 - 29 mmol/L Final   • Calcium 08/18/2021 8.9  8.7 - 10.3 mg/dL Final   • Total Protein 08/18/2021 6.7  6.0 - 8.5 g/dL Final   • Albumin 08/18/2021 4.0  3.6 - 4.6 g/dL Final   • Globulin 08/18/2021 2.7  1.5 - 4.5 g/dL Final   • A/G Ratio 08/18/2021 1.5  1.2 - 2.2 Final   • Total Bilirubin 08/18/2021 0.3  0.0 - 1.2 mg/dL Final   • Alkaline Phosphatase 08/18/2021 67  48 - 121 IU/L Final   • AST (SGOT) 08/18/2021 18  0 - 40 IU/L Final   • ALT (SGPT) 08/18/2021 16  0 - 32 IU/L Final   • Total Cholesterol 08/18/2021 221* 100 - 199 mg/dL Final   • Triglycerides 08/18/2021 275* 0 - 149 mg/dL Final   • HDL Cholesterol 08/18/2021 40  >39 mg/dL Final   • VLDL Cholesterol Eitan 08/18/2021 49* 5 - 40 mg/dL Final   • LDL Chol Calc (NIH) 08/18/2021 132* 0 - 99 mg/dL Final   • Hemoglobin A1C 08/18/2021 7.7* 4.8 - 5.6 % Final    Comment:          Prediabetes: 5.7 - 6.4           Diabetes: >6.4           Glycemic control for adults with diabetes: <7.0     • [facility] SARS-COV-2, SAVANNAH 2 DAY TAT 08/18/2021 Performed   Final         Assessment/Plan   Diagnoses and all orders for this visit:    1. Type 2 diabetes mellitus with hyperglycemia, without long-term current use of insulin (HCC) (Primary)  -     Hemoglobin A1c  -     Comprehensive Metabolic Panel  -     MicroAlbumin, Urine, Random - Urine, Clean Catch    2. Essential hypertension    3. Mixed stress and urge urinary incontinence  -     tolterodine LA (Detrol LA) 2 MG 24 hr capsule; Take 1 capsule by mouth Daily.  Dispense: 30  capsule; Refill: 5    4. Dyslipidemia    Multiple medical problems as above.  It appears that her diabetes control may be waning.  Recheck A1c and urinalysis today.  May need to start some kind of treatment to help manage her diabetes.  Blood pressure control today borderline.  Using the bigger cuff, she is in the 150s.  May need to make an adjustment to her blood pressure medicine.   At overage 75, unclear exactly what the specific benefits of a statin would be.    New problem of urinary incontinence-sounds like mixed type.  We will do a trial of Detrol LA.  Can increase this or change to a different medication if needed.  I will follow-up with her when the results of her test are back.  We will see her back here in at least 6 months unless we need to begin treatment for her diabetes.      Call with any problems or concerns before next visit       Return in about 6 months (around 8/18/2022).      Much of this report is an electronic transcription of spoken language to printed text using Dragon dictation software.  As such, the subtleties and finesse of spoken language may permit erroneous, or at times, nonsensical words or phrases to be inadvertently transcribed; thus changes may be made at a later date to rectify these errors.     Clairssa Ward MD2/18/202211:52 EST  This note has been electronically signed

## 2022-02-19 LAB
ALBUMIN SERPL-MCNC: 3.9 G/DL (ref 3.6–4.6)
ALBUMIN/GLOB SERPL: 1.3 {RATIO} (ref 1.2–2.2)
ALP SERPL-CCNC: 69 IU/L (ref 44–121)
ALT SERPL-CCNC: 16 IU/L (ref 0–32)
AST SERPL-CCNC: 18 IU/L (ref 0–40)
BILIRUB SERPL-MCNC: 0.4 MG/DL (ref 0–1.2)
BUN SERPL-MCNC: 18 MG/DL (ref 8–27)
BUN/CREAT SERPL: 21 (ref 12–28)
CALCIUM SERPL-MCNC: 9.5 MG/DL (ref 8.7–10.3)
CHLORIDE SERPL-SCNC: 100 MMOL/L (ref 96–106)
CO2 SERPL-SCNC: 26 MMOL/L (ref 20–29)
CREAT SERPL-MCNC: 0.85 MG/DL (ref 0.57–1)
GLOBULIN SER CALC-MCNC: 3 G/DL (ref 1.5–4.5)
GLUCOSE SERPL-MCNC: 141 MG/DL (ref 65–99)
HBA1C MFR BLD: 8.1 % (ref 4.8–5.6)
MICROALBUMIN UR-MCNC: 5.6 UG/ML
POTASSIUM SERPL-SCNC: 4.2 MMOL/L (ref 3.5–5.2)
PROT SERPL-MCNC: 6.9 G/DL (ref 6–8.5)
SODIUM SERPL-SCNC: 144 MMOL/L (ref 134–144)

## 2022-02-21 RX ORDER — METFORMIN HYDROCHLORIDE 500 MG/1
1000 TABLET, EXTENDED RELEASE ORAL
Qty: 60 TABLET | Refills: 5 | Status: SHIPPED | OUTPATIENT
Start: 2022-02-21

## 2022-03-24 ENCOUNTER — TELEPHONE (OUTPATIENT)
Dept: FAMILY MEDICINE CLINIC | Facility: CLINIC | Age: 82
End: 2022-03-24

## 2022-03-24 ENCOUNTER — OFFICE VISIT (OUTPATIENT)
Dept: FAMILY MEDICINE CLINIC | Facility: CLINIC | Age: 82
End: 2022-03-24

## 2022-03-24 VITALS
SYSTOLIC BLOOD PRESSURE: 170 MMHG | TEMPERATURE: 96.8 F | BODY MASS INDEX: 39.3 KG/M2 | DIASTOLIC BLOOD PRESSURE: 70 MMHG | HEIGHT: 64 IN | OXYGEN SATURATION: 96 % | HEART RATE: 85 BPM | WEIGHT: 230.2 LBS

## 2022-03-24 DIAGNOSIS — L03.115 CELLULITIS OF RIGHT LOWER EXTREMITY: Primary | ICD-10-CM

## 2022-03-24 PROBLEM — H35.3190 NONEXUDATIVE AGE-RELATED MACULAR DEGENERATION: Status: ACTIVE | Noted: 2020-06-26

## 2022-03-24 PROCEDURE — 99213 OFFICE O/P EST LOW 20 MIN: CPT | Performed by: NURSE PRACTITIONER

## 2022-03-24 RX ORDER — COLCHICINE 0.6 MG/1
0.6 TABLET ORAL 2 TIMES DAILY
Qty: 10 TABLET | Refills: 0 | Status: SHIPPED | OUTPATIENT
Start: 2022-03-24 | End: 2022-08-12

## 2022-03-24 RX ORDER — CEPHALEXIN 500 MG/1
500 CAPSULE ORAL 2 TIMES DAILY
Qty: 14 CAPSULE | Refills: 0 | Status: SHIPPED | OUTPATIENT
Start: 2022-03-24 | End: 2022-08-12

## 2022-03-24 RX ORDER — COLCHICINE 0.6 MG/1
0.6 TABLET ORAL DAILY
Qty: 10 TABLET | Refills: 0 | Status: SHIPPED | OUTPATIENT
Start: 2022-03-24 | End: 2022-03-24

## 2022-03-24 NOTE — PROGRESS NOTES
"    Neida Hernandez is a 82 y.o. female.     82-year-old white female with history of type 2 diabetes, hypertension hyperlipidemia who comes in today with several day history of redness below right toe that is painful.  Patient has not had a history of gout and greater toe is not painful or warm to touch.  Will be checking a uric acid today placing her on colchicine and Keflex to rule out cellulitis.  Patient denies wearing any new or tight shoes are hitting her foot    Blood pressure is elevated 160/60 heart rate 84 she denies any chest pain, dyspnea, tachycardia or dizziness.  I have recommended patient speak to Dr. Ward next time she sees him since her blood pressure was elevated on last visit also           Keflex 500 mg twice daily  Colchicine 0.6 mg twice daily x5 days  Uric acid level  Follow-up as needed                     The following portions of the patient's history were reviewed and updated as appropriate: allergies, current medications, past family history, past medical history, past social history, past surgical history and problem list.    Vitals:    03/24/22 1407   BP: 170/70   BP Location: Left arm   Patient Position: Sitting   Cuff Size: Adult   Pulse: 85   Temp: 96.8 °F (36 °C)   TempSrc: Infrared   SpO2: 96%   Weight: 104 kg (230 lb 3.2 oz)   Height: 162.9 cm (64.13\")     Body mass index is 39.35 kg/m².    Past Medical History:   Diagnosis Date   • Abdominal wall hernia    • Degenerative arthritis    • Glucose intolerance    • Hypertension    • Nephrolithiasis    • Pedal edema    • Sinusitis    • Sleep apnea      Past Surgical History:   Procedure Laterality Date   • CARDIAC CATHETERIZATION  06/2013   • REPLACEMENT TOTAL KNEE Left 2013   • VENTRAL HERNIA REPAIR      x 3      Family History   Problem Relation Age of Onset   • Hypertension Mother    • Heart disease Mother    • Stroke Mother    • Hypertension Father    • Heart disease Father    • No Known Problems Sister    • No Known Problems " Daughter    • Kidney disease Son    • No Known Problems Sister    • No Known Problems Sister    • No Known Problems Sister    • No Known Problems Son    • No Known Problems Son      Immunization History   Administered Date(s) Administered   • COVID-19 (UNSPECIFIED) 01/14/2021, 02/25/2021, 01/05/2022   • Fluad Quad 65+ 09/20/2021   • Fluzone High Dose =>65 Years ([employer] ONLY) 10/21/2015, 10/26/2016, 11/17/2019   • Pneumococcal Conjugate 13-Valent (PCV13) 10/28/2015   • Pneumococcal Polysaccharide (PPSV23) 01/25/2017       Office Visit on 02/18/2022   Component Date Value Ref Range Status   • Hemoglobin A1C 02/18/2022 8.1 (A) 4.8 - 5.6 % Final    Comment:          Prediabetes: 5.7 - 6.4           Diabetes: >6.4           Glycemic control for adults with diabetes: <7.0     • Glucose 02/18/2022 141 (A) 65 - 99 mg/dL Final   • BUN 02/18/2022 18  8 - 27 mg/dL Final   • Creatinine 02/18/2022 0.85  0.57 - 1.00 mg/dL Final   • eGFR Non  Am 02/18/2022 64  >59 mL/min/1.73 Final   • eGFR African Am 02/18/2022 74  >59 mL/min/1.73 Final    Comment: **In accordance with recommendations from the NKF-ASN Task force,**    [facility] is in the [facility] updating its eGFR calculation to the    2021 CKD-EPI creatinine equation that estimates kidney function    without a race variable.     • BUN/Creatinine Ratio 02/18/2022 21 12 - 28 Final   • Sodium 02/18/2022 144  134 - 144 mmol/L Final   • Potassium 02/18/2022 4.2  3.5 - 5.2 mmol/L Final   • Chloride 02/18/2022 100  96 - 106 mmol/L Final   • Total CO2 02/18/2022 26  20 - 29 mmol/L Final   • Calcium 02/18/2022 9.5  8.7 - 10.3 mg/dL Final   • Total Protein 02/18/2022 6.9  6.0 - 8.5 g/dL Final   • Albumin 02/18/2022 3.9  3.6 - 4.6 g/dL Final   • Globulin 02/18/2022 3.0  1.5 - 4.5 g/dL Final   • A/G Ratio 02/18/2022 1.3  1.2 - 2.2 Final   • Total Bilirubin 02/18/2022 0.4  0.0 - 1.2 mg/dL Final   • Alkaline Phosphatase 02/18/2022 69  44 - 121 IU/L Final   • AST (SGOT) 02/18/2022 18   0 - 40 IU/L Final   • ALT (SGPT) 02/18/2022 16  0 - 32 IU/L Final   • Microalbumin, Urine 02/18/2022 5.6  Not Estab. ug/mL Final         Review of Systems   HENT: Negative.    Respiratory: Negative.    Cardiovascular: Negative.    Gastrointestinal: Negative.    Genitourinary: Negative.    Musculoskeletal:        Right foot pain   Skin:        Redness and pain right foot   Neurological: Negative.    Psychiatric/Behavioral: Negative.        Objective   Physical Exam  Constitutional:       Appearance: Normal appearance.   HENT:      Head: Normocephalic.   Cardiovascular:      Rate and Rhythm: Normal rate.      Pulses: Normal pulses.      Heart sounds: Normal heart sounds.   Pulmonary:      Effort: Pulmonary effort is normal.      Breath sounds: Normal breath sounds.   Abdominal:      General: Bowel sounds are normal.   Musculoskeletal:         General: Normal range of motion.   Skin:     Comments: Below greater toe area of redness that goes over onto the ball of the foot that is warm red and painful to touch.  Patient denies any known injury or new shoes that are tight   Neurological:      General: No focal deficit present.      Mental Status: She is alert and oriented to person, place, and time.   Psychiatric:         Mood and Affect: Mood normal.         Behavior: Behavior normal.         Procedures    Assessment/Plan   Diagnoses and all orders for this visit:    1. Cellulitis of right lower extremity (Primary)  -     Uric acid; Future    Other orders  -     Discontinue: colchicine 0.6 MG tablet; Take 1 tablet by mouth Daily.  Dispense: 10 tablet; Refill: 0  -     cephalexin (Keflex) 500 MG capsule; Take 1 capsule by mouth 2 (Two) Times a Day.  Dispense: 14 capsule; Refill: 0  -     colchicine 0.6 MG tablet; Take 1 tablet by mouth 2 (Two) Times a Day.  Dispense: 10 tablet; Refill: 0          Current Outpatient Medications:   •  albuterol sulfate  (90 Base) MCG/ACT inhaler, Inhale 2 puffs Every 4 (Four) Hours  As Needed., Disp: , Rfl:   •  bumetanide (BUMEX) 1 MG tablet, Take 1 tablet by mouth Daily., Disp: 90 tablet, Rfl: 3  •  colchicine 0.6 MG tablet, Take 1 tablet by mouth 2 (Two) Times a Day., Disp: 10 tablet, Rfl: 0  •  Cyanocobalamin (B-12) 1000 MCG tablet controlled-release, Take 1 tablet by mouth Daily., Disp: , Rfl:   •  cyclobenzaprine (FLEXERIL) 5 MG tablet, Take 1 tablet by mouth every night at bedtime., Disp: 14 tablet, Rfl: 0  •  fluticasone (FLONASE) 50 MCG/ACT nasal spray, 2 sprays into the nostril(s) as directed by provider Daily., Disp: 1 bottle, Rfl: 2  •  imiquimod (Aldara) 5 % cream, Apply  topically to the appropriate area as directed 3 (Three) Times a Week., Disp: 12 packet, Rfl: 0  •  lisinopril (PRINIVIL,ZESTRIL) 40 MG tablet, Take 1 tablet by mouth Daily., Disp: 90 tablet, Rfl: 1  •  LUTEIN PO, Take 1 capsule by mouth Daily., Disp: , Rfl:   •  metFORMIN ER (Glucophage XR) 500 MG 24 hr tablet, Take 2 tablets by mouth Daily With Breakfast., Disp: 60 tablet, Rfl: 5  •  tolterodine LA (Detrol LA) 2 MG 24 hr capsule, Take 1 capsule by mouth Daily., Disp: 30 capsule, Rfl: 5  •  cephalexin (Keflex) 500 MG capsule, Take 1 capsule by mouth 2 (Two) Times a Day., Disp: 14 capsule, Rfl: 0  •  Cholecalciferol 125 MCG (5000 UT) tablet, , Disp: , Rfl:            IRAIDA Darnell 3/24/2022 14:39 EDT  This note has been electronically signed

## 2022-03-24 NOTE — PATIENT INSTRUCTIONS
Keflex 500 mg twice daily  Colchicine 0.6 mg twice daily x5 days  Uric acid level  Follow-up as needed

## 2022-03-25 LAB — URATE SERPL-MCNC: 8.8 MG/DL (ref 3.1–7.9)

## 2022-03-28 ENCOUNTER — TELEPHONE (OUTPATIENT)
Dept: FAMILY MEDICINE CLINIC | Facility: CLINIC | Age: 82
End: 2022-03-28

## 2022-03-29 NOTE — TELEPHONE ENCOUNTER
HUB TO READ    LEFT MESSAGE TO RETURN CALL     Uric acid was elevated so she did have gout if medications I put her on do not resolve the problem let us know     
Spoke with patient voiced the medication has been helping voiced she had no other questions or concerns.    
no weight-bearing restrictions

## 2022-04-07 DIAGNOSIS — R60.0 PEDAL EDEMA: ICD-10-CM

## 2022-04-07 DIAGNOSIS — I10 ESSENTIAL HYPERTENSION: ICD-10-CM

## 2022-04-07 RX ORDER — BUMETANIDE 1 MG/1
1 TABLET ORAL DAILY
Qty: 90 TABLET | Refills: 3 | Status: SHIPPED | OUTPATIENT
Start: 2022-04-07 | End: 2023-03-18 | Stop reason: SDUPTHER

## 2022-04-07 RX ORDER — LISINOPRIL 40 MG/1
40 TABLET ORAL DAILY
Qty: 90 TABLET | Refills: 1 | Status: SHIPPED | OUTPATIENT
Start: 2022-04-07 | End: 2022-10-03 | Stop reason: SDUPTHER

## 2022-04-07 NOTE — TELEPHONE ENCOUNTER
Caller: Neida Hernandez KEVIN    Relationship: Self    Best call back number: [phone]    Requested Prescriptions:   Requested Prescriptions     Pending Prescriptions Disp Refills   • lisinopril (PRINIVIL,ZESTRIL) 40 MG tablet 90 tablet 1     Sig: Take 1 tablet by mouth Daily.   • bumetanide (BUMEX) 1 MG tablet 90 tablet 3     Sig: Take 1 tablet by mouth Daily.        Pharmacy where request should be sent: [facility] [address] [place] [phone] [place], [place] [phone]      Additional details provided by patient:     Does the patient have less than a 3 day supply:  [] Yes  [x] No    Larry Nichole Rep   04/07/22 09:34 EDT

## 2022-08-12 ENCOUNTER — OFFICE VISIT (OUTPATIENT)
Dept: FAMILY MEDICINE CLINIC | Facility: CLINIC | Age: 82
End: 2022-08-12

## 2022-08-12 VITALS
RESPIRATION RATE: 18 BRPM | DIASTOLIC BLOOD PRESSURE: 78 MMHG | HEIGHT: 64 IN | TEMPERATURE: 97.1 F | OXYGEN SATURATION: 95 % | HEART RATE: 73 BPM | BODY MASS INDEX: 38.82 KG/M2 | WEIGHT: 227.4 LBS | SYSTOLIC BLOOD PRESSURE: 124 MMHG

## 2022-08-12 DIAGNOSIS — E78.5 DYSLIPIDEMIA: ICD-10-CM

## 2022-08-12 DIAGNOSIS — R09.82 POST-NASAL DRIP: ICD-10-CM

## 2022-08-12 DIAGNOSIS — G47.30 SLEEP APNEA, UNSPECIFIED TYPE: ICD-10-CM

## 2022-08-12 DIAGNOSIS — R05.9 COUGH: ICD-10-CM

## 2022-08-12 DIAGNOSIS — J30.9 ALLERGIC RHINITIS, UNSPECIFIED SEASONALITY, UNSPECIFIED TRIGGER: Primary | ICD-10-CM

## 2022-08-12 DIAGNOSIS — I10 ESSENTIAL HYPERTENSION: ICD-10-CM

## 2022-08-12 DIAGNOSIS — L82.1 SEBORRHEIC KERATOSIS: ICD-10-CM

## 2022-08-12 DIAGNOSIS — E11.65 TYPE 2 DIABETES MELLITUS WITH HYPERGLYCEMIA, WITHOUT LONG-TERM CURRENT USE OF INSULIN: ICD-10-CM

## 2022-08-12 PROBLEM — E66.01 MORBID OBESITY (HCC): Status: ACTIVE | Noted: 2022-08-12

## 2022-08-12 PROCEDURE — 99214 OFFICE O/P EST MOD 30 MIN: CPT | Performed by: FAMILY MEDICINE

## 2022-08-12 RX ORDER — VIT C/E/ZN/COPPR/LUTEIN/ZEAXAN 250 MG-2MG
1 CAPSULE ORAL DAILY
COMMUNITY

## 2022-08-12 RX ORDER — CETIRIZINE HYDROCHLORIDE 5 MG/1
5 TABLET ORAL DAILY
Qty: 30 TABLET | Refills: 5 | Status: SHIPPED | OUTPATIENT
Start: 2022-08-12

## 2022-08-12 NOTE — PROGRESS NOTES
"Subjective   Neida Hernandez is a 82 y.o. female.   Chief Complaint   Patient presents with   • Skin Lesion   • Shortness of Breath       History of Present Illness   Presents to the office today to be evaluated for spot on her back and rattling when she breathes.  She explains to me that her daughter saw the spot on her back and thought it needed to be looked at \"right away\".  Neida did not even know it was there.  Regarding the rattling when she talks, again, she tells me her daughter tells me she rattles when she talks.  She herself denies any shortness of breath.  She tells me she has lots of allergies.  She does not take anything for them.  She does not notice herself rattling.  She has known sleep apnea and she wears a CPAP machine with oxygen entrained at night.    I last saw her about 6 months ago.  Since then Morena saw her back in March for cellulitis.  That has resolved.  Last labs were also around 6 months ago.    Diabetes type 2- remained off medications for a long time.  We started her on metformin about 6 months ago after her A1c peaked at 8.1%.    HTN- blood pressure today looks great at 124/68.    HLD- last lipid panel was about a year ago.  She does not take a statin.  She did not want 1 back then.    She thought she was coming in for Medicare wellness today, but that is actually scheduled for next Friday.      Patient Active Problem List    Diagnosis Date Noted   • Morbid obesity (HCC) 08/12/2022   • Localized osteoporosis without current pathological fracture 02/12/2021   • Nonexudative age-related macular degeneration 06/26/2020   • Muscle spasm of back 09/09/2019   • Allergic rhinitis 10/16/2018     Note Last Updated: 8/12/2022     TO MOLD, POLLEN     • Dizziness 07/02/2018   • Back pain 04/03/2018   • Chest discomfort 04/03/2018   • Cough 04/03/2018   • Other problems related to lifestyle 12/18/2017   • Postmenopausal 12/18/2017   • Screening for thyroid disorder 05/24/2017   • Cramp of " "extremity 01/25/2017   • Visit for screening mammogram 07/29/2016   • 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   • Sjogren's syndrome (HCC) 01/20/2016   • Degenerative arthritis 10/21/2015   • Diabetes mellitus, type II (HCC) 10/21/2015     Note Last Updated: 8/12/2020     \"borderline\" - diagnosed years ago     • Dyslipidemia 10/21/2015   • Essential hypertension 10/21/2015   • Hernia of anterior abdominal wall 10/21/2015   • History of renal calculi 10/21/2015   • Obesity with body mass index 30 or greater 10/21/2015   • Pedal edema 10/21/2015   • Sleep apnea 10/21/2015     Note Last Updated: 8/12/2020     Uses CPAP with Oxygen at night only- got hypoxic during knee surgery             Past Surgical History:   Procedure Laterality Date   • CARDIAC CATHETERIZATION  06/2013   • REPLACEMENT TOTAL KNEE Left 2013   • VENTRAL HERNIA REPAIR      x 3      Current Outpatient Medications on File Prior to Visit   Medication Sig   • bumetanide (BUMEX) 1 MG tablet Take 1 tablet by mouth Daily.   • Cholecalciferol 125 MCG (5000 UT) tablet    • Cyanocobalamin (B-12) 1000 MCG tablet controlled-release Take 1 tablet by mouth Daily.   • lisinopril (PRINIVIL,ZESTRIL) 40 MG tablet Take 1 tablet by mouth Daily.   • Magnesium 100 MG capsule Take 1 capsule by mouth As Needed.   • metFORMIN ER (Glucophage XR) 500 MG 24 hr tablet Take 2 tablets by mouth Daily With Breakfast.   • Multiple Vitamins-Minerals (Eye Multivitamin) capsule Take 1 capsule by mouth Daily.   • Potassium 99 MG tablet Take 1 tablet by mouth As Needed.   • albuterol sulfate  (90 Base) MCG/ACT inhaler Inhale 2 puffs Every 4 (Four) Hours As Needed.   • fluticasone (FLONASE) 50 MCG/ACT nasal spray 2 sprays into the nostril(s) as directed by provider Daily.   • imiquimod (Aldara) 5 % cream Apply  topically to the appropriate area as directed 3 (Three) Times a Week.   • LUTEIN PO Take 1 capsule by mouth Daily.   • tolterodine LA (Detrol LA) 2 MG 24 hr capsule Take 1 " "capsule by mouth Daily.   • [DISCONTINUED] cephalexin (Keflex) 500 MG capsule Take 1 capsule by mouth 2 (Two) Times a Day.   • [DISCONTINUED] colchicine 0.6 MG tablet Take 1 tablet by mouth 2 (Two) Times a Day.   • [DISCONTINUED] cyclobenzaprine (FLEXERIL) 5 MG tablet Take 1 tablet by mouth every night at bedtime.     No current facility-administered medications on file prior to visit.     Allergies   Allergen Reactions   • Codeine GI Intolerance   • Hydrocodone GI Intolerance     Social History     Socioeconomic History   • Marital status:    Tobacco Use   • Smoking status: Never Smoker   • Smokeless tobacco: Never Used   Vaping Use   • Vaping Use: Never used   Substance and Sexual Activity   • Alcohol use: No   • Drug use: No   • Sexual activity: Defer     Family History   Problem Relation Age of Onset   • Hypertension Mother    • Heart disease Mother    • Stroke Mother    • Hypertension Father    • Heart disease Father    • No Known Problems Sister    • No Known Problems Daughter    • Kidney disease Son    • No Known Problems Sister    • No Known Problems Sister    • No Known Problems Sister    • No Known Problems Son    • No Known Problems Son        Review of Systems    Objective   /78 (BP Location: Left arm, Patient Position: Sitting, Cuff Size: Large Adult)   Pulse 73   Temp 97.1 °F (36.2 °C) (Infrared)   Resp 18   Ht 162.9 cm (64.13\")   Wt 103 kg (227 lb 6.4 oz)   LMP  (LMP Unknown)   SpO2 95%   Breastfeeding No   BMI 38.88 kg/m²   Physical Exam  Constitutional:       General: She is not in acute distress.     Appearance: She is well-developed.      Comments: Wearing a face mask     HENT:      Head: Normocephalic and atraumatic.      Mouth/Throat:      Mouth: Mucous membranes are moist.      Comments: She has a lot of posterior pharyngeal mucus.  I think her Mallampati class is 3-4.   Eyes:      Conjunctiva/sclera: Conjunctivae normal.   Neck:      Comments: Neck is very short and " "stout.  Cardiovascular:      Rate and Rhythm: Normal rate and regular rhythm.      Heart sounds: Normal heart sounds. No murmur heard.  Pulmonary:      Effort: Pulmonary effort is normal. No accessory muscle usage or respiratory distress.      Breath sounds: Normal breath sounds. No wheezing or rhonchi.   Musculoskeletal:         General: Normal range of motion.      Cervical back: Normal range of motion.   Skin:     General: Skin is warm and dry.      Findings: No rash.   Neurological:      Mental Status: She is alert and oriented to person, place, and time.   Psychiatric:         Behavior: Behavior normal.           No visits with results within 4 Month(s) from this visit.   Latest known visit with results is:   Orders Only on 03/24/2022   Component Date Value Ref Range Status   • Uric Acid 03/24/2022 8.8 (A) 3.1 - 7.9 mg/dL Final               Therapeutic target for gout patients: <6.0         Assessment & Plan   Diagnoses and all orders for this visit:    1. Allergic rhinitis, unspecified seasonality, unspecified trigger (Primary)    2. Post-nasal drip  -     cetirizine (zyrTEC) 5 MG tablet; Take 1 tablet by mouth Daily.  Dispense: 30 tablet; Refill: 5    3. Cough  -     cetirizine (zyrTEC) 5 MG tablet; Take 1 tablet by mouth Daily.  Dispense: 30 tablet; Refill: 5  -     XR Chest PA & Lateral    4. Seborrheic keratosis    5. Essential hypertension  -     Comprehensive Metabolic Panel  -     CBC & Differential    6. Type 2 diabetes mellitus with hyperglycemia, without long-term current use of insulin (HCC)  -     Hemoglobin A1c    7. Dyslipidemia  -     Lipid Panel    I reassured her about the benign nature of a seborrheic keratosis.  I think her drainage and \"rattling\" when she talks is due to postnasal drainage.  We will start her on Zyrtec 5 mg/day and go ahead and get a chest x-ray just to make sure her lungs look good.  I think her Mallampati class is 3 or 4 and that probably contributes to postnasal " drainage getting caught in her upper airway.  We will get labs to monitor status of her chronic problems.  Hypertension is also a chronic problem which is compensated.  I will see her back next week.  We will go over her blood work and do her Medicare wellness at that time.        Call with any problems or concerns before next visit       Return for f/uy in a few weeks for Medicare Wellness.      Much of this report is an electronic transcription of spoken language to printed text using Dragon dictation software.  As such, the subtleties and finesse of spoken language may permit erroneous, or at times, nonsensical words or phrases to be inadvertently transcribed; thus changes may be made at a later date to rectify these errors.     Clarissa Ward MD8/12/202210:36 EDT  This note has been electronically signed

## 2022-08-13 LAB
ALBUMIN SERPL-MCNC: 4.1 G/DL (ref 3.6–4.6)
ALBUMIN/GLOB SERPL: 1.5 {RATIO} (ref 1.2–2.2)
ALP SERPL-CCNC: 70 IU/L (ref 44–121)
ALT SERPL-CCNC: 16 IU/L (ref 0–32)
AST SERPL-CCNC: 19 IU/L (ref 0–40)
BASOPHILS # BLD AUTO: 0.1 X10E3/UL (ref 0–0.2)
BASOPHILS NFR BLD AUTO: 1 %
BILIRUB SERPL-MCNC: 0.3 MG/DL (ref 0–1.2)
BUN SERPL-MCNC: 19 MG/DL (ref 8–27)
BUN/CREAT SERPL: 20 (ref 12–28)
CALCIUM SERPL-MCNC: 9.1 MG/DL (ref 8.7–10.3)
CHLORIDE SERPL-SCNC: 102 MMOL/L (ref 96–106)
CHOLEST SERPL-MCNC: 224 MG/DL (ref 100–199)
CO2 SERPL-SCNC: 25 MMOL/L (ref 20–29)
CREAT SERPL-MCNC: 0.94 MG/DL (ref 0.57–1)
EGFRCR SERPLBLD CKD-EPI 2021: 61 ML/MIN/1.73
EOSINOPHIL # BLD AUTO: 0.1 X10E3/UL (ref 0–0.4)
EOSINOPHIL NFR BLD AUTO: 2 %
ERYTHROCYTE [DISTWIDTH] IN BLOOD BY AUTOMATED COUNT: 12.7 % (ref 11.7–15.4)
GLOBULIN SER CALC-MCNC: 2.8 G/DL (ref 1.5–4.5)
GLUCOSE SERPL-MCNC: 152 MG/DL (ref 65–99)
HBA1C MFR BLD: 8 % (ref 4.8–5.6)
HCT VFR BLD AUTO: 39.2 % (ref 34–46.6)
HDLC SERPL-MCNC: 42 MG/DL
HGB BLD-MCNC: 13 G/DL (ref 11.1–15.9)
IMM GRANULOCYTES # BLD AUTO: 0 X10E3/UL (ref 0–0.1)
IMM GRANULOCYTES NFR BLD AUTO: 0 %
LDLC SERPL CALC-MCNC: 135 MG/DL (ref 0–99)
LYMPHOCYTES # BLD AUTO: 2.4 X10E3/UL (ref 0.7–3.1)
LYMPHOCYTES NFR BLD AUTO: 33 %
MCH RBC QN AUTO: 30.9 PG (ref 26.6–33)
MCHC RBC AUTO-ENTMCNC: 33.2 G/DL (ref 31.5–35.7)
MCV RBC AUTO: 93 FL (ref 79–97)
MONOCYTES # BLD AUTO: 0.6 X10E3/UL (ref 0.1–0.9)
MONOCYTES NFR BLD AUTO: 8 %
NEUTROPHILS # BLD AUTO: 4.1 X10E3/UL (ref 1.4–7)
NEUTROPHILS NFR BLD AUTO: 56 %
PLATELET # BLD AUTO: 240 X10E3/UL (ref 150–450)
POTASSIUM SERPL-SCNC: 4.3 MMOL/L (ref 3.5–5.2)
PROT SERPL-MCNC: 6.9 G/DL (ref 6–8.5)
RBC # BLD AUTO: 4.21 X10E6/UL (ref 3.77–5.28)
SODIUM SERPL-SCNC: 144 MMOL/L (ref 134–144)
TRIGL SERPL-MCNC: 262 MG/DL (ref 0–149)
VLDLC SERPL CALC-MCNC: 47 MG/DL (ref 5–40)
WBC # BLD AUTO: 7.3 X10E3/UL (ref 3.4–10.8)

## 2022-08-15 NOTE — PROGRESS NOTES
The ABCs of the Annual Wellness Visit  Subsequent Medicare Wellness Visit    Chief Complaint   Patient presents with   • Medicare Wellness-subsequent      Subjective    History of Present Illness:  Neida Hernandez is a 82 y.o. female who presents for a Subsequent Medicare Wellness Visit.      Also had lab work recently.  CBC-normal  Lipid panel- total cholesterol-224, LDL-135, HDL-42, triglycerides-262  A1c- 8%.  Down from 8.1% 5 months ago.  CMP- LFTs, renal function normal.  Blood sugar 152.  See discussion below regarding her labs.    The following portions of the patient's history were reviewed and   updated as appropriate: allergies, current medications, past family history, past medical history, past social history, past surgical history and problem list.    Compared to one year ago, the patient feels her physical   health is the same.    Compared to one year ago, the patient feels her mental   health is the same.    Recent Hospitalizations:  She was not admitted to the hospital during the last year.       Current Medical Providers:  Patient Care Team:  Clarissa Ward MD as PCP - General (Family Medicine)    Outpatient Medications Prior to Visit   Medication Sig Dispense Refill   • albuterol sulfate  (90 Base) MCG/ACT inhaler Inhale 2 puffs Every 4 (Four) Hours As Needed.     • bumetanide (BUMEX) 1 MG tablet Take 1 tablet by mouth Daily. 90 tablet 3   • cetirizine (zyrTEC) 5 MG tablet Take 1 tablet by mouth Daily. 30 tablet 5   • Cholecalciferol 125 MCG (5000 UT) tablet      • Cyanocobalamin (B-12) 1000 MCG tablet controlled-release Take 1 tablet by mouth Daily.     • fluticasone (FLONASE) 50 MCG/ACT nasal spray 2 sprays into the nostril(s) as directed by provider Daily. 1 bottle 2   • imiquimod (Aldara) 5 % cream Apply  topically to the appropriate area as directed 3 (Three) Times a Week. 12 packet 0   • lisinopril (PRINIVIL,ZESTRIL) 40 MG tablet Take 1 tablet by mouth Daily. 90 tablet 1   •  LUTEIN PO Take 1 capsule by mouth Daily.     • Magnesium 100 MG capsule Take 1 capsule by mouth As Needed.     • metFORMIN ER (Glucophage XR) 500 MG 24 hr tablet Take 2 tablets by mouth Daily With Breakfast. 60 tablet 5   • Multiple Vitamins-Minerals (Eye Multivitamin) capsule Take 1 capsule by mouth Daily.     • Potassium 99 MG tablet Take 1 tablet by mouth As Needed.     • tolterodine LA (Detrol LA) 2 MG 24 hr capsule Take 1 capsule by mouth Daily. 30 capsule 5     No facility-administered medications prior to visit.       No opioid medication identified on active medication list. I have reviewed chart for other potential  high risk medication/s and harmful drug interactions in the elderly.          Aspirin is not on active medication list.  Aspirin use is not indicated based on review of current medical condition/s. Risk of harm outweighs potential benefits.  .    Patient Active Problem List   Diagnosis   • Allergic rhinitis   • Back pain   • Chest discomfort   • Cough   • Cramp of extremity   • Degenerative arthritis   • Diabetes mellitus, type II (HCC)   • Dizziness   • Dyslipidemia   • Essential hypertension   • Hernia of anterior abdominal wall   • History of renal calculi   • Obesity with body mass index 30 or greater   • Other problems related to lifestyle   • Pedal edema   • Postmenopausal   • Sjogren's syndrome (HCC)   • Sleep apnea   • Screening for thyroid disorder   • Visit for screening mammogram   • Muscle spasm of back   • Localized osteoporosis without current pathological fracture   • Ocular histoplasmosis syndrome   • Nonexudative age-related macular degeneration   • Morbid obesity (HCC)     Advance Care Planning  Advance Directive is not on file.  ACP discussion was held with the patient during this visit. Patient does not have an advance directive, information provided.          Objective    Vitals:    08/19/22 1133 08/19/22 1134   BP: 148/76 136/76   BP Location: Right arm Right arm   Patient  "Position: Sitting Sitting   Cuff Size: Adult Adult   Pulse: 74    Temp: 97.1 °F (36.2 °C)    TempSrc: Infrared    SpO2: 95%    Weight: 103 kg (226 lb 3.2 oz)    Height: 162.9 cm (64.13\")      Estimated body mass index is 38.67 kg/m² as calculated from the following:    Height as of this encounter: 162.9 cm (64.13\").    Weight as of this encounter: 103 kg (226 lb 3.2 oz).    Class 2 Severe Obesity (BMI >=35 and <=39.9). Obesity-related health conditions include the following: hypertension, diabetes mellitus and osteoarthritis. Obesity is unchanged. BMI is is above average; BMI management plan is completed. We discussed low calorie, low carb based diet program, portion control and increasing exercise.      Does the patient have evidence of cognitive impairment? No    Physical Exam  Constitutional:       Appearance: She is well-developed. She is obese.      Comments: Wearing a face mask     HENT:      Head: Normocephalic and atraumatic.   Eyes:      Conjunctiva/sclera: Conjunctivae normal.   Cardiovascular:      Rate and Rhythm: Normal rate.   Pulmonary:      Effort: Pulmonary effort is normal.   Musculoskeletal:         General: Normal range of motion.      Cervical back: Normal range of motion.      Right lower leg: No edema.      Left lower leg: No edema.   Skin:     General: Skin is warm and dry.      Findings: No rash.   Neurological:      Mental Status: She is alert and oriented to person, place, and time.   Psychiatric:         Behavior: Behavior normal.       Lab Results   Component Value Date    CHLPL 224 (H) 08/12/2022    TRIG 262 (H) 08/12/2022    HDL 42 08/12/2022     (H) 08/12/2022    VLDL 47 (H) 08/12/2022    HGBA1C 8.0 (H) 08/12/2022            HEALTH RISK ASSESSMENT    Smoking Status:  Social History     Tobacco Use   Smoking Status Never Smoker   Smokeless Tobacco Never Used     Alcohol Consumption:  Social History     Substance and Sexual Activity   Alcohol Use No     Fall Risk " Screen:    CAMADI Fall Risk Assessment was completed, and patient is at LOW risk for falls.Assessment completed on:2/18/2022    Depression Screening:  PHQ-2/PHQ-9 Depression Screening 8/12/2022   Retired PHQ-9 Total Score -   Retired Total Score -   Little Interest or Pleasure in Doing Things 0-->not at all   Feeling Down, Depressed or Hopeless 0-->not at all   PHQ-9: Brief Depression Severity Measure Score 0       Health Habits and Functional and Cognitive Screening:  Functional & Cognitive Status 8/19/2022   Do you have difficulty preparing food and eating? No   Do you have difficulty bathing yourself, getting dressed or grooming yourself? No   Do you have difficulty using the toilet? No   Do you have difficulty moving around from place to place? No   Do you have trouble with steps or getting out of a bed or a chair? No   Current Diet Unhealthy Diet   Dental Exam Not up to date   Eye Exam Up to date   Exercise (times per week) 0 times per week   Do you need help using the phone?  No   Are you deaf or do you have serious difficulty hearing?  No   Do you need help with transportation? No   Do you need help shopping? No   Do you need help preparing meals?  No   Do you need help with housework?  No   Do you need help with laundry? No   Do you need help taking your medications? No   Do you need help managing money? No   Do you ever drive or ride in a car without wearing a seat belt? No   Have you felt unusual stress, anger or loneliness in the last month? No   Who do you live with? Alone   If you need help, do you have trouble finding someone available to you? No   Have you been bothered in the last four weeks by sexual problems? No   Do you have difficulty concentrating, remembering or making decisions? No       Age-appropriate Screening Schedule:  Refer to the list below for future screening recommendations based on patient's age, sex and/or medical conditions. Orders for these recommended tests are listed in the  plan section. The patient has been provided with a written plan.    Health Maintenance   Topic Date Due   • TDAP/TD VACCINES (1 - Tdap) Never done   • ZOSTER VACCINE (1 of 2) Never done   • INFLUENZA VACCINE  10/01/2022   • DIABETIC EYE EXAM  12/15/2022   • HEMOGLOBIN A1C  02/12/2023   • URINE MICROALBUMIN  02/18/2023   • DXA SCAN  04/05/2023              Assessment & Plan   CMS Preventative Services Quick Reference  Risk Factors Identified During Encounter  Cardiovascular Disease  Immunizations Discussed/Encouraged (specific Immunizations; Influenza, Prevnar 20 (Pneumococcal 20-valent conjugate), Shingrix and COVID19  Inadequate Social Support, Isolation, Loneliness, Lack of Transportation, Financial Difficulties, or Caregiver Stress   Inactivity/Sedentary  Obesity/Overweight   The above risks/problems have been discussed with the patient.  Follow up actions/plans if indicated are seen below in the Assessment/Plan Section.  Pertinent information has been shared with the patient in the After Visit Summary.    Diagnoses and all orders for this visit:    1. Physical exam, annual (Primary)    2. Dyslipidemia    3. Type 2 diabetes mellitus with hyperglycemia, without long-term current use of insulin (HCC)  -     glimepiride (Amaryl) 1 MG tablet; Take 1 tablet by mouth Every Morning Before Breakfast.  Dispense: 90 tablet; Refill: 3    Age-appropriate preventive care discussed with her today.  She declines further mammograms.  She had a DEXA scan a year and a half ago that was normal.  Last colonoscopy was over 10 years ago.  She does not want to do any more of those.  She does not need another Pap smear.  We talked about other vaccines and she will consider those in the future.  I also reviewed the status of her diabetes and I reviewed recent labs with her as above.  There is unclear benefit to treating her cholesterol level with a statin.  Her A1c is 8.1.  We will add glimepiride 1 mg daily to see if we can reduce that  a bit more.  She admits that she eats a lot of sweets and does not know that she can change her diet.  We did talk about how that would be preferable in managing her diabetes, and we can support her with additional medication while she is trying to make some of those changes.  Follow-up in about 6 months we will repeat her labs again at that time.    Follow Up:   Return in about 6 months (around 2/19/2023).     An After Visit Summary and PPPS were made available to the patient.

## 2022-08-19 ENCOUNTER — OFFICE VISIT (OUTPATIENT)
Dept: FAMILY MEDICINE CLINIC | Facility: CLINIC | Age: 82
End: 2022-08-19

## 2022-08-19 VITALS
SYSTOLIC BLOOD PRESSURE: 136 MMHG | TEMPERATURE: 97.1 F | OXYGEN SATURATION: 95 % | WEIGHT: 226.2 LBS | HEART RATE: 74 BPM | HEIGHT: 64 IN | BODY MASS INDEX: 38.62 KG/M2 | DIASTOLIC BLOOD PRESSURE: 76 MMHG

## 2022-08-19 DIAGNOSIS — E11.65 TYPE 2 DIABETES MELLITUS WITH HYPERGLYCEMIA, WITHOUT LONG-TERM CURRENT USE OF INSULIN: ICD-10-CM

## 2022-08-19 DIAGNOSIS — Z00.00 PHYSICAL EXAM, ANNUAL: Primary | ICD-10-CM

## 2022-08-19 DIAGNOSIS — E78.5 DYSLIPIDEMIA: ICD-10-CM

## 2022-08-19 PROCEDURE — 1170F FXNL STATUS ASSESSED: CPT | Performed by: FAMILY MEDICINE

## 2022-08-19 PROCEDURE — 1159F MED LIST DOCD IN RCRD: CPT | Performed by: FAMILY MEDICINE

## 2022-08-19 PROCEDURE — G0439 PPPS, SUBSEQ VISIT: HCPCS | Performed by: FAMILY MEDICINE

## 2022-08-19 RX ORDER — GLIMEPIRIDE 1 MG/1
1 TABLET ORAL
Qty: 90 TABLET | Refills: 3 | Status: SHIPPED | OUTPATIENT
Start: 2022-08-19

## 2022-10-03 RX ORDER — LISINOPRIL 40 MG/1
40 TABLET ORAL DAILY
Qty: 90 TABLET | Refills: 1 | Status: SHIPPED | OUTPATIENT
Start: 2022-10-03 | End: 2023-03-31 | Stop reason: SDUPTHER

## 2022-10-03 NOTE — TELEPHONE ENCOUNTER
Caller: Neida Hernandez    Relationship: Self    Best call back number: [phone]    Requested Prescriptions:   Requested Prescriptions     Pending Prescriptions Disp Refills   • lisinopril (PRINIVIL,ZESTRIL) 40 MG tablet 90 tablet 1     Sig: Take 1 tablet by mouth Daily.        Pharmacy where request should be sent: [facility] [address] [place], [place] [id number] [place] [phone] FX     Additional details provided by patient: HAS 4 LEFT    Does the patient have less than a 3 day supply:  [x] Yes  [] No    Larry Roland Rep   10/03/22 10:28 EDT

## 2022-10-21 ENCOUNTER — TELEPHONE (OUTPATIENT)
Dept: FAMILY MEDICINE CLINIC | Facility: CLINIC | Age: 82
End: 2022-10-21

## 2022-10-21 DIAGNOSIS — U07.1 POSITIVE SELF-ADMINISTERED ANTIGEN TEST FOR COVID-19: Primary | ICD-10-CM

## 2022-10-21 NOTE — TELEPHONE ENCOUNTER
Caller: Neida Hernandez    Relationship to patient: Self    Best call back number: [phone]    Date of positive COVID19 test: 10/21/22    COVID19 symptoms: FEVER, NASAL DRAINAGE, CONGESTION, PRODUCTIVE COUGH WITH GREEN SPUTUM, AND SORE THROAT. ONSET OF SYMPTOMS 10/20/22.    Additional information or concerns: PATIENT IS REQUESTING TO KNOW WHAT SHE SHOULD DO SINCE SHE HAD TESTED POSITIVE FOR COVID-19. PLEASE ADVISE.     What is the patients preferred pharmacy:[place] [id number] [address] [address]

## 2022-10-21 NOTE — TELEPHONE ENCOUNTER
I called patient and let her know that Paxlovid was sent to her pharmacy. She voiced understanding.

## 2023-02-21 ENCOUNTER — OFFICE VISIT (OUTPATIENT)
Dept: FAMILY MEDICINE CLINIC | Facility: CLINIC | Age: 83
End: 2023-02-21
Payer: MEDICARE

## 2023-02-21 VITALS
BODY MASS INDEX: 38.28 KG/M2 | TEMPERATURE: 97.8 F | HEIGHT: 64 IN | HEART RATE: 73 BPM | DIASTOLIC BLOOD PRESSURE: 75 MMHG | OXYGEN SATURATION: 94 % | WEIGHT: 224.2 LBS | SYSTOLIC BLOOD PRESSURE: 146 MMHG

## 2023-02-21 DIAGNOSIS — K57.90 DIVERTICULOSIS: ICD-10-CM

## 2023-02-21 DIAGNOSIS — K43.9 HERNIA OF ANTERIOR ABDOMINAL WALL: ICD-10-CM

## 2023-02-21 DIAGNOSIS — E11.65 TYPE 2 DIABETES MELLITUS WITH HYPERGLYCEMIA, WITHOUT LONG-TERM CURRENT USE OF INSULIN: ICD-10-CM

## 2023-02-21 DIAGNOSIS — R60.0 PEDAL EDEMA: ICD-10-CM

## 2023-02-21 DIAGNOSIS — I10 ESSENTIAL HYPERTENSION: Primary | ICD-10-CM

## 2023-02-21 PROCEDURE — 99214 OFFICE O/P EST MOD 30 MIN: CPT | Performed by: FAMILY MEDICINE

## 2023-02-21 RX ORDER — POLYETHYLENE GLYCOL 3350 17 G/17G
17 POWDER, FOR SOLUTION ORAL DAILY
Qty: 527 G | Refills: 1 | Status: SHIPPED | OUTPATIENT
Start: 2023-02-21

## 2023-02-21 NOTE — PROGRESS NOTES
"Subjective   Neida Hernandez is a 83 y.o. female.   Chief Complaint   Patient presents with   • Diabetes   • Hyperlipidemia   • Abdominal Pain     About once a month has lower abdominal pain that will last 2-3 days   No changes in Bowel habits during this time.       History of Present Illness   83-year-old white female presents to the office today to follow-up on chronic medical problems per active problem list as below.    She has diabetes type 2.  A1c 6 months ago was 8%.  She is on Amaryl and metformin.    HTN- she is on lisinopril 40 mg/day.    Lower extremity edema-on Bumex 1 mg/day.    Bone density test was February 2021.  It was actually normal.  She does not get mammograms anymore.    New complaint today is that of cyclic lower abdominal pain.  Seems to happen about once a month, but not on any type of routine cycle.  Usually associated with some cramping, and then some loose stool following harder than normal stool.      Patient Active Problem List    Diagnosis Date Noted   • Positive self-administered antigen test for COVID-19 10/21/2022   • Morbid obesity (HCC) 08/12/2022   • Localized osteoporosis without current pathological fracture 02/12/2021   • Nonexudative age-related macular degeneration 06/26/2020   • Muscle spasm of back 09/09/2019   • Allergic rhinitis 10/16/2018     Note Last Updated: 8/12/2022     TO MOLD, POLLEN     • Dizziness 07/02/2018   • Back pain 04/03/2018   • Chest discomfort 04/03/2018   • Cough 04/03/2018   • Other problems related to lifestyle 12/18/2017   • Postmenopausal 12/18/2017   • Screening for thyroid disorder 05/24/2017   • Cramp of extremity 01/25/2017   • Visit for screening mammogram 07/29/2016   • 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   • Sjogren's syndrome (HCC) 01/20/2016   • Degenerative arthritis 10/21/2015   • Diabetes mellitus, type II ([facility]) 10/21/2015     Note Last Updated: 8/12/2020     \"borderline\" - diagnosed years ago     • Dyslipidemia 10/21/2015   • " Essential hypertension 10/21/2015   • Hernia of anterior abdominal wall 10/21/2015   • History of renal calculi 10/21/2015   • Obesity with body mass index 30 or greater 10/21/2015   • Pedal edema 10/21/2015   • Sleep apnea 10/21/2015     Note Last Updated: 8/12/2020     Uses CPAP with Oxygen at night only- got hypoxic during knee surgery             Past Surgical History:   Procedure Laterality Date   • CARDIAC CATHETERIZATION  06/2013   • REPLACEMENT TOTAL KNEE Left 2013   • VENTRAL HERNIA REPAIR      x 3      Current Outpatient Medications on File Prior to Visit   Medication Sig   • albuterol sulfate  (90 Base) MCG/ACT inhaler Inhale 2 puffs Every 4 (Four) Hours As Needed.   • bumetanide (BUMEX) 1 MG tablet Take 1 tablet by mouth Daily.   • cetirizine (zyrTEC) 5 MG tablet Take 1 tablet by mouth Daily.   • Cholecalciferol 125 MCG (5000 UT) tablet    • Cyanocobalamin (B-12) 1000 MCG tablet controlled-release Take 1 tablet by mouth Daily.   • fluticasone (FLONASE) 50 MCG/ACT nasal spray 2 sprays into the nostril(s) as directed by provider Daily.   • glimepiride (Amaryl) 1 MG tablet Take 1 tablet by mouth Every Morning Before Breakfast.   • imiquimod (Aldara) 5 % cream Apply  topically to the appropriate area as directed 3 (Three) Times a Week.   • lisinopril (PRINIVIL,ZESTRIL) 40 MG tablet Take 1 tablet by mouth Daily.   • LUTEIN PO Take 1 capsule by mouth Daily.   • Magnesium 100 MG capsule Take 1 capsule by mouth As Needed.   • metFORMIN ER (Glucophage XR) 500 MG 24 hr tablet Take 2 tablets by mouth Daily With Breakfast.   • Multiple Vitamins-Minerals (Eye Multivitamin) capsule Take 1 capsule by mouth Daily.   • Potassium 99 MG tablet Take 1 tablet by mouth As Needed.   • tolterodine LA (Detrol LA) 2 MG 24 hr capsule Take 1 capsule by mouth Daily.     No current facility-administered medications on file prior to visit.     Allergies   Allergen Reactions   • Codeine GI Intolerance   • Hydrocodone GI  "Intolerance     Social History     Socioeconomic History   • Marital status:    Tobacco Use   • Smoking status: Never   • Smokeless tobacco: Never   Vaping Use   • Vaping Use: Never used   Substance and Sexual Activity   • Alcohol use: No   • Drug use: No   • Sexual activity: Defer     Family History   Problem Relation Age of Onset   • Hypertension Mother    • Heart disease Mother    • Stroke Mother    • Hypertension Father    • Heart disease Father    • No Known Problems Sister    • No Known Problems Daughter    • Kidney disease Son    • No Known Problems Sister    • No Known Problems Sister    • No Known Problems Sister    • No Known Problems Son    • No Known Problems Son        Review of Systems    Objective   /75   Pulse 73   Temp 97.8 °F (36.6 °C)   Ht 162.9 cm (64.13\")   Wt 102 kg (224 lb 3.2 oz)   LMP  (LMP Unknown)   SpO2 94%   BMI 38.33 kg/m²   Physical Exam  Constitutional:       Appearance: She is well-developed. She is obese.      Comments: Wearing a face mask  Elderly white female-looks younger than her known age of 83 years   HENT:      Head: Normocephalic and atraumatic.   Eyes:      Conjunctiva/sclera: Conjunctivae normal.   Cardiovascular:      Rate and Rhythm: Normal rate and regular rhythm.      Heart sounds: No murmur heard.  Pulmonary:      Effort: Pulmonary effort is normal. No respiratory distress.      Breath sounds: Normal breath sounds.   Abdominal:      General: Bowel sounds are increased. There is distension.      Tenderness: There is no abdominal tenderness. There is no guarding or rebound.      Hernia: A hernia (Right ventral) is present.   Musculoskeletal:         General: Normal range of motion.      Cervical back: Normal range of motion.      Right lower leg: No edema.      Left lower leg: No edema.   Skin:     General: Skin is warm and dry.      Findings: No rash.   Neurological:      Mental Status: She is alert and oriented to person, place, and time. Mental " status is at baseline.      Gait: Gait normal.   Psychiatric:         Behavior: Behavior normal.           No visits with results within 4 Month(s) from this visit.   Latest known visit with results is:   Office Visit on 08/12/2022   Component Date Value Ref Range Status   • Glucose 08/12/2022 152 (H)  65 - 99 mg/dL Final   • BUN 08/12/2022 19  8 - 27 mg/dL Final   • Creatinine 08/12/2022 0.94  0.57 - 1.00 mg/dL Final   • EGFR Result 08/12/2022 61  >59 mL/min/1.73 Final   • BUN/Creatinine Ratio 08/12/2022 20  12 - 28 Final   • Sodium 08/12/2022 144  134 - 144 mmol/L Final   • Potassium 08/12/2022 4.3  3.5 - 5.2 mmol/L Final   • Chloride 08/12/2022 102  96 - 106 mmol/L Final   • Total CO2 08/12/2022 25  20 - 29 mmol/L Final   • Calcium 08/12/2022 9.1  8.7 - 10.3 mg/dL Final   • Total Protein 08/12/2022 6.9  6.0 - 8.5 g/dL Final   • Albumin 08/12/2022 4.1  3.6 - 4.6 g/dL Final   • Globulin 08/12/2022 2.8  1.5 - 4.5 g/dL Final   • A/G Ratio 08/12/2022 1.5  1.2 - 2.2 Final   • Total Bilirubin 08/12/2022 0.3  0.0 - 1.2 mg/dL Final   • Alkaline Phosphatase 08/12/2022 70  44 - 121 IU/L Final   • AST (SGOT) 08/12/2022 19  0 - 40 IU/L Final   • ALT (SGPT) 08/12/2022 16  0 - 32 IU/L Final   • Hemoglobin A1C 08/12/2022 8.0 (H)  4.8 - 5.6 % Final    Comment:          Prediabetes: 5.7 - 6.4           Diabetes: >6.4           Glycemic control for adults with diabetes: <7.0     • Total Cholesterol 08/12/2022 224 (H)  100 - 199 mg/dL Final   • Triglycerides 08/12/2022 262 (H)  0 - 149 mg/dL Final   • HDL Cholesterol 08/12/2022 42  >39 mg/dL Final   • VLDL Cholesterol Eitan 08/12/2022 47 (H)  5 - 40 mg/dL Final   • LDL Chol Calc ([facility]) 08/12/2022 135 (H)  0 - 99 mg/dL Final   • WBC 08/12/2022 7.3  3.4 - 10.8 x10E3/uL Final   • RBC 08/12/2022 4.21  3.77 - 5.28 x10E6/uL Final   • Hemoglobin 08/12/2022 13.0  11.1 - 15.9 g/dL Final   • Hematocrit 08/12/2022 39.2  34.0 - 46.6 % Final   • MCV 08/12/2022 93  79 - 97 fL Final   • MCH  08/12/2022 30.9  26.6 - 33.0 pg Final   • MCHC 08/12/2022 33.2  31.5 - 35.7 g/dL Final   • RDW 08/12/2022 12.7  11.7 - 15.4 % Final   • Platelets 08/12/2022 240  150 - 450 x10E3/uL Final   • Neutrophil Rel % 08/12/2022 56  Not Estab. % Final   • Lymphocyte Rel % 08/12/2022 33  Not Estab. % Final   • Monocyte Rel % 08/12/2022 8  Not Estab. % Final   • Eosinophil Rel % 08/12/2022 2  Not Estab. % Final   • Basophil Rel % 08/12/2022 1  Not Estab. % Final   • Neutrophils Absolute 08/12/2022 4.1  1.4 - 7.0 x10E3/uL Final   • Lymphocytes Absolute 08/12/2022 2.4  0.7 - 3.1 x10E3/uL Final   • Monocytes Absolute 08/12/2022 0.6  0.1 - 0.9 x10E3/uL Final   • Eosinophils Absolute 08/12/2022 0.1  0.0 - 0.4 x10E3/uL Final   • Basophils Absolute 08/12/2022 0.1  0.0 - 0.2 x10E3/uL Final   • Immature Granulocyte Rel % 08/12/2022 0  Not Estab. % Final   • Immature Grans Absolute 08/12/2022 0.0  0.0 - 0.1 x10E3/uL Final         Assessment & Plan   Diagnoses and all orders for this visit:    1. Essential hypertension (Primary)  -     Comprehensive Metabolic Panel  -     CBC & Differential    2. Type 2 diabetes mellitus with hyperglycemia, without long-term current use of insulin (HCC)  -     Hemoglobin A1c    3. Pedal edema    4. Hernia of anterior abdominal wall  -     XR Abdomen KUB (In Office)    Hypertension is a chronic problem which is under reasonable control with a blood pressure of 146/75 today.  Continue lisinopril 40 mg/day.    We will get labs today to follow status of her diabetes.  We will screen her for anemia and check her renal function since she is on an ACE inhibitor.  Swelling in her feet is stable.  Continue Bumex as above.  I suspect that the cyclic abdominal pain has to do with stool retention and her diverticulosis.  We will start her on daily MiraLAX to keep her stools soft and avoid anything getting caught in the diverticula, and to avoid any unnecessary swelling of the colon that could aggravate her abdominal  wall hernia.  I did review the images of the abdominal x-ray with her and there was no clear obstructive pattern.      Follow-up in 6 months for Medicare wellness.    Call with any problems or concerns before next visit       No follow-ups on file.      Much of this report is an electronic transcription of spoken language to printed text using Dragon dictation software.  As such, the subtleties and finesse of spoken language may permit erroneous, or at times, nonsensical words or phrases to be inadvertently transcribed; thus changes may be made at a later date to rectify these errors.     Clarissa Ward MD2/21/202311:22 EST  This note has been electronically signed

## 2023-02-22 LAB
ALBUMIN SERPL-MCNC: 4.2 G/DL (ref 3.6–4.6)
ALBUMIN/GLOB SERPL: 1.7 {RATIO} (ref 1.2–2.2)
ALP SERPL-CCNC: 67 IU/L (ref 44–121)
ALT SERPL-CCNC: 12 IU/L (ref 0–32)
AST SERPL-CCNC: 14 IU/L (ref 0–40)
BASOPHILS # BLD AUTO: 0.1 X10E3/UL (ref 0–0.2)
BASOPHILS NFR BLD AUTO: 1 %
BILIRUB SERPL-MCNC: 0.4 MG/DL (ref 0–1.2)
BUN SERPL-MCNC: 20 MG/DL (ref 8–27)
BUN/CREAT SERPL: 21 (ref 12–28)
CALCIUM SERPL-MCNC: 9.4 MG/DL (ref 8.7–10.3)
CHLORIDE SERPL-SCNC: 99 MMOL/L (ref 96–106)
CO2 SERPL-SCNC: 29 MMOL/L (ref 20–29)
CREAT SERPL-MCNC: 0.97 MG/DL (ref 0.57–1)
EGFRCR SERPLBLD CKD-EPI 2021: 58 ML/MIN/1.73
EOSINOPHIL # BLD AUTO: 0.2 X10E3/UL (ref 0–0.4)
EOSINOPHIL NFR BLD AUTO: 2 %
ERYTHROCYTE [DISTWIDTH] IN BLOOD BY AUTOMATED COUNT: 12.7 % (ref 11.7–15.4)
GLOBULIN SER CALC-MCNC: 2.5 G/DL (ref 1.5–4.5)
GLUCOSE SERPL-MCNC: 150 MG/DL (ref 70–99)
HBA1C MFR BLD: 7.7 % (ref 4.8–5.6)
HCT VFR BLD AUTO: 39.5 % (ref 34–46.6)
HGB BLD-MCNC: 13.1 G/DL (ref 11.1–15.9)
IMM GRANULOCYTES # BLD AUTO: 0 X10E3/UL (ref 0–0.1)
IMM GRANULOCYTES NFR BLD AUTO: 0 %
LYMPHOCYTES # BLD AUTO: 3.2 X10E3/UL (ref 0.7–3.1)
LYMPHOCYTES NFR BLD AUTO: 36 %
MCH RBC QN AUTO: 31.7 PG (ref 26.6–33)
MCHC RBC AUTO-ENTMCNC: 33.2 G/DL (ref 31.5–35.7)
MCV RBC AUTO: 96 FL (ref 79–97)
MONOCYTES # BLD AUTO: 0.8 X10E3/UL (ref 0.1–0.9)
MONOCYTES NFR BLD AUTO: 8 %
NEUTROPHILS # BLD AUTO: 4.7 X10E3/UL (ref 1.4–7)
NEUTROPHILS NFR BLD AUTO: 53 %
PLATELET # BLD AUTO: 259 X10E3/UL (ref 150–450)
POTASSIUM SERPL-SCNC: 4.5 MMOL/L (ref 3.5–5.2)
PROT SERPL-MCNC: 6.7 G/DL (ref 6–8.5)
RBC # BLD AUTO: 4.13 X10E6/UL (ref 3.77–5.28)
SODIUM SERPL-SCNC: 143 MMOL/L (ref 134–144)
WBC # BLD AUTO: 8.9 X10E3/UL (ref 3.4–10.8)

## 2023-03-18 DIAGNOSIS — I10 ESSENTIAL HYPERTENSION: ICD-10-CM

## 2023-03-18 DIAGNOSIS — R60.0 PEDAL EDEMA: ICD-10-CM

## 2023-03-18 RX ORDER — BUMETANIDE 1 MG/1
1 TABLET ORAL DAILY
Qty: 90 TABLET | Refills: 3 | Status: SHIPPED | OUTPATIENT
Start: 2023-03-18

## 2023-03-31 RX ORDER — LISINOPRIL 40 MG/1
40 TABLET ORAL DAILY
Qty: 90 TABLET | Refills: 1 | Status: SHIPPED | OUTPATIENT
Start: 2023-03-31

## 2023-03-31 NOTE — TELEPHONE ENCOUNTER
Caller: Neida Hernandez KEVIN    Relationship: Self    Best call back number: [phone]  Requested Prescriptions:   Requested Prescriptions     Pending Prescriptions Disp Refills   • lisinopril (PRINIVIL,ZESTRIL) 40 MG tablet 90 tablet 1     Sig: Take 1 tablet by mouth Daily.        Pharmacy where request should be sent: [facility] [address] [place], [place] [phone] [place], [place] [phone] FX     Last office visit with prescribing clinician: 2/21/2023   Last telemedicine visit with prescribing clinician: 8/21/2023   Next office visit with prescribing clinician: 8/21/2023       Does the patient have less than a 3 day supply:  [] Yes  [x] No    Would you like a call back once the refill request has been completed: [] Yes [x] No    If the office needs to give you a call back, can they leave a voicemail: [x] Yes [] No    Larry Lutz Rep   03/31/23 15:49 EDT

## 2023-08-21 ENCOUNTER — OFFICE VISIT (OUTPATIENT)
Dept: FAMILY MEDICINE CLINIC | Facility: CLINIC | Age: 83
End: 2023-08-21
Payer: MEDICARE

## 2023-08-21 VITALS
RESPIRATION RATE: 18 BRPM | BODY MASS INDEX: 38.72 KG/M2 | OXYGEN SATURATION: 92 % | HEART RATE: 61 BPM | DIASTOLIC BLOOD PRESSURE: 80 MMHG | HEIGHT: 64 IN | SYSTOLIC BLOOD PRESSURE: 148 MMHG | WEIGHT: 226.8 LBS

## 2023-08-21 DIAGNOSIS — M81.6 LOCALIZED OSTEOPOROSIS WITHOUT CURRENT PATHOLOGICAL FRACTURE: ICD-10-CM

## 2023-08-21 DIAGNOSIS — I10 ESSENTIAL HYPERTENSION: ICD-10-CM

## 2023-08-21 DIAGNOSIS — E78.5 DYSLIPIDEMIA: ICD-10-CM

## 2023-08-21 DIAGNOSIS — R10.2 PELVIC PAIN: ICD-10-CM

## 2023-08-21 DIAGNOSIS — G47.31 PRIMARY CENTRAL SLEEP APNEA: ICD-10-CM

## 2023-08-21 DIAGNOSIS — E11.65 TYPE 2 DIABETES MELLITUS WITH HYPERGLYCEMIA, WITHOUT LONG-TERM CURRENT USE OF INSULIN: ICD-10-CM

## 2023-08-21 DIAGNOSIS — Z00.01 ENCOUNTER FOR ROUTINE ADULT MEDICAL EXAM WITH ABNORMAL FINDINGS: Primary | ICD-10-CM

## 2023-08-21 DIAGNOSIS — K57.90 DIVERTICULOSIS: ICD-10-CM

## 2023-08-21 PROCEDURE — G0439 PPPS, SUBSEQ VISIT: HCPCS | Performed by: FAMILY MEDICINE

## 2023-08-21 PROCEDURE — 1160F RVW MEDS BY RX/DR IN RCRD: CPT | Performed by: FAMILY MEDICINE

## 2023-08-21 PROCEDURE — 1159F MED LIST DOCD IN RCRD: CPT | Performed by: FAMILY MEDICINE

## 2023-08-21 PROCEDURE — 3077F SYST BP >= 140 MM HG: CPT | Performed by: FAMILY MEDICINE

## 2023-08-21 PROCEDURE — 99214 OFFICE O/P EST MOD 30 MIN: CPT | Performed by: FAMILY MEDICINE

## 2023-08-21 PROCEDURE — 3079F DIAST BP 80-89 MM HG: CPT | Performed by: FAMILY MEDICINE

## 2023-08-21 PROCEDURE — 1170F FXNL STATUS ASSESSED: CPT | Performed by: FAMILY MEDICINE

## 2023-08-21 RX ORDER — POLYETHYLENE GLYCOL 3350 17 G/17G
17 POWDER, FOR SOLUTION ORAL DAILY
Qty: 527 G | Refills: 1 | Status: SHIPPED | OUTPATIENT
Start: 2023-08-21

## 2023-08-21 NOTE — PROGRESS NOTES
The ABCs of the Annual Wellness Visit  Subsequent Medicare Wellness Visit    Subjective    Neida Hernandez is a 83 y.o. female who presents for a Subsequent Medicare Wellness Visit.    The following portions of the patient's history were reviewed and   updated as appropriate: allergies, current medications, past family history, past medical history, past social history, past surgical history, and problem list.    Compared to one year ago, the patient feels her physical   health is the same.    Compared to one year ago, the patient feels her mental   health is the same.    Recent Hospitalizations:  She was not admitted to the hospital during the last year.       Current Medical Providers:  Patient Care Team:  lCarissa Ward MD as PCP - General (Family Medicine)    Outpatient Medications Prior to Visit   Medication Sig Dispense Refill    albuterol sulfate  (90 Base) MCG/ACT inhaler Inhale 2 puffs Every 4 (Four) Hours As Needed.      bumetanide (BUMEX) 1 MG tablet Take 1 tablet by mouth Daily. 90 tablet 3    cetirizine (zyrTEC) 5 MG tablet Take 1 tablet by mouth Daily. 30 tablet 5    Cholecalciferol 125 MCG (5000 UT) tablet       Cyanocobalamin (B-12) 1000 MCG tablet controlled-release Take 1 tablet by mouth Daily.      fluticasone (FLONASE) 50 MCG/ACT nasal spray 2 sprays into the nostril(s) as directed by provider Daily. 1 bottle 2    glimepiride (Amaryl) 1 MG tablet Take 1 tablet by mouth Every Morning Before Breakfast. 90 tablet 3    imiquimod (Aldara) 5 % cream Apply  topically to the appropriate area as directed 3 (Three) Times a Week. 12 packet 0    lisinopril (PRINIVIL,ZESTRIL) 40 MG tablet Take 1 tablet by mouth Daily. 90 tablet 1    LUTEIN PO Take 1 capsule by mouth Daily.      Magnesium 100 MG capsule Take 1 capsule by mouth As Needed.      metFORMIN ER (Glucophage XR) 500 MG 24 hr tablet Take 2 tablets by mouth Daily With Breakfast. 60 tablet 5    Multiple Vitamins-Minerals (Eye Multivitamin)  "capsule Take 1 capsule by mouth Daily.      Potassium 99 MG tablet Take 1 tablet by mouth As Needed.      tolterodine LA (Detrol LA) 2 MG 24 hr capsule Take 1 capsule by mouth Daily. 30 capsule 5    polyethylene glycol (MIRALAX) 17 GM/SCOOP powder Take 17 g by mouth Daily. Mixed in 8 ounces of fluid of choice 527 g 1     No facility-administered medications prior to visit.       No opioid medication identified on active medication list. I have reviewed chart for other potential  high risk medication/s and harmful drug interactions in the elderly.        Aspirin is not on active medication list.  Aspirin use is not indicated based on review of current medical condition/s. Risk of harm outweighs potential benefits.  .    Patient Active Problem List   Diagnosis    Allergic rhinitis    Back pain    Chest discomfort    Cough    Cramp of extremity    Degenerative arthritis    Diabetes mellitus, type II    Dizziness    Dyslipidemia    Essential hypertension    Hernia of anterior abdominal wall    History of renal calculi    Obesity with body mass index 30 or greater    Other problems related to lifestyle    Pedal edema    Postmenopausal    Sjogren's syndrome    Sleep apnea    Screening for thyroid disorder    Visit for screening mammogram    Muscle spasm of back    Localized osteoporosis without current pathological fracture    Ocular histoplasmosis syndrome    Nonexudative age-related macular degeneration    Morbid obesity    Positive self-administered antigen test for COVID-19     Advance Care Planning   Advance Care Planning     Advance Directive is not on file.  ACP discussion was held with the patient during this visit. Patient does not have an advance directive, declines further assistance.     Objective    Vitals:    08/21/23 1108   BP: 148/80   BP Location: Right arm   Patient Position: Sitting   Cuff Size: Large Adult   Pulse: 61   Resp: 18   SpO2: 92%   Weight: 103 kg (226 lb 12.8 oz)   Height: 162.9 cm (64.13\") " "  PainSc: 0-No pain     Estimated body mass index is 38.77 kg/mý as calculated from the following:    Height as of this encounter: 162.9 cm (64.13\").    Weight as of this encounter: 103 kg (226 lb 12.8 oz).    Class 2 Severe Obesity (BMI >=35 and <=39.9). Obesity-related health conditions include the following: obstructive sleep apnea, hypertension, diabetes mellitus, dyslipidemias, osteoarthritis, and urinary stress incontinence. Obesity is unchanged. BMI is is above average; BMI management plan is completed. We discussed low calorie, low carb based diet program, portion control, and increasing exercise.      Does the patient have evidence of cognitive impairment? No          HEALTH RISK ASSESSMENT    Smoking Status:  Social History     Tobacco Use   Smoking Status Never    Passive exposure: Never   Smokeless Tobacco Never     Alcohol Consumption:  Social History     Substance and Sexual Activity   Alcohol Use No     Fall Risk Screen:    CAMADI Fall Risk Assessment was completed, and patient is at LOW risk for falls.Assessment completed on:2023    Depression Screenin/21/2023    11:06 AM   PHQ-2/PHQ-9 Depression Screening   Little Interest or Pleasure in Doing Things 0-->not at all   Feeling Down, Depressed or Hopeless 0-->not at all   PHQ-9: Brief Depression Severity Measure Score 0       Health Habits and Functional and Cognitive Screenin/21/2023    11:06 AM   Functional & Cognitive Status   Do you have difficulty preparing food and eating? No   Do you have difficulty bathing yourself, getting dressed or grooming yourself? No   Do you have difficulty using the toilet? No   Do you have difficulty moving around from place to place? No   Do you have trouble with steps or getting out of a bed or a chair? No   Current Diet Unhealthy Diet   Dental Exam Up to date   Eye Exam Up to date   Exercise (times per week) 0 times per week   Current Exercises Include No Regular Exercise   Do you need help " using the phone?  No   Are you deaf or do you have serious difficulty hearing?  Yes   Do you need help to go to places out of walking distance? No   Do you need help shopping? No   Do you need help preparing meals?  No   Do you need help with housework?  No   Do you need help with laundry? No   Do you need help taking your medications? No   Do you need help managing money? No   Do you ever drive or ride in a car without wearing a seat belt? No   Have you felt unusual stress, anger or loneliness in the last month? No   Who do you live with? Alone   If you need help, do you have trouble finding someone available to you? No   Have you been bothered in the last four weeks by sexual problems? No   Do you have difficulty concentrating, remembering or making decisions? Yes       Age-appropriate Screening Schedule:  Refer to the list below for future screening recommendations based on patient's age, sex and/or medical conditions. Orders for these recommended tests are listed in the plan section. The patient has been provided with a written plan.    Health Maintenance   Topic Date Due    TDAP/TD VACCINES (1 - Tdap) Never done    ZOSTER VACCINE (1 of 2) Never done    COVID-19 Vaccine (4 - Mixed Product series) 03/02/2022    URINE MICROALBUMIN  02/18/2023    DXA SCAN  04/05/2023    HEMOGLOBIN A1C  08/21/2023    INFLUENZA VACCINE  10/01/2023    DIABETIC EYE EXAM  02/13/2024    ANNUAL WELLNESS VISIT  08/21/2024    Pneumococcal Vaccine 65+  Completed                  CMS Preventative Services Quick Reference  Risk Factors Identified During Encounter  None Identified  The above risks/problems have been discussed with the patient.  Pertinent information has been shared with the patient in the After Visit Summary.  An After Visit Summary and PPPS were made available to the patient.    Follow Up:   Next Medicare Wellness visit to be scheduled in 1 year.       Additional E&M Note during same encounter follows:  Patient has multiple  "medical problems which are significant and separately identifiable that require additional work above and beyond the Medicare Wellness Visit.      Chief Complaint  Medicare Wellness-Initial Visit, Diabetes, Hypertension, and Hyperlipidemia    Subjective        HPI  Neida Hernandez is also being seen today for f/u on chronic med probs.    HTN -blood pressure today was 148/80.  Tolerating current medications including Bumex, lisinopril without side effects.      DM -does not have any blood sugar record.  Takes metformin daily.  Reports no side effects from this.    Had another episode of nausea, low pelvic pain.  I put her on MiraLAX in the past.  She has evidence reticulosis, previous adhesions.  We get the problem to go away by using the stool softeners last time.           Objective   Vital Signs:  /80 (BP Location: Right arm, Patient Position: Sitting, Cuff Size: Large Adult)   Pulse 61   Resp 18   Ht 162.9 cm (64.13\")   Wt 103 kg (226 lb 12.8 oz)   SpO2 92%   BMI 38.77 kg/mý     Physical Exam  Constitutional:       Appearance: She is well-developed.      Comments:   Overweight, elderly white female no distress.   HENT:      Head: Normocephalic and atraumatic.   Eyes:      Conjunctiva/sclera: Conjunctivae normal.   Cardiovascular:      Rate and Rhythm: Normal rate and regular rhythm.      Heart sounds: Normal heart sounds.   Pulmonary:      Effort: Pulmonary effort is normal. No respiratory distress.      Breath sounds: Normal breath sounds.   Musculoskeletal:         General: Normal range of motion.      Cervical back: Normal range of motion.      Right lower leg: Edema (trace) present.      Left lower leg: Edema (trace) present.   Skin:     General: Skin is warm and dry.      Findings: No rash.   Neurological:      Mental Status: She is alert and oriented to person, place, and time.   Psychiatric:         Behavior: Behavior normal.                       Assessment and Plan   Diagnoses and all orders " for this visit:    1. AnnPhyABNRL (Primary)    2. Essential hypertension  -     Comprehensive Metabolic Panel  -     CBC & Differential    3. Type 2 diabetes mellitus with hyperglycemia, without long-term current use of insulin  -     Hemoglobin A1c  -     MicroAlbumin, Urine, Random - Urine, Clean Catch    4. Dyslipidemia  -     Lipid Panel    5. Pelvic pain  -     Urinalysis With Culture If Indicated - Urine, Clean Catch  -     polyethylene glycol (MIRALAX) 17 GM/SCOOP powder; Take 17 g by mouth Daily. Mixed in 8 ounces of fluid of choice  Dispense: 527 g; Refill: 1    6. Diverticulosis  -     polyethylene glycol (MIRALAX) 17 GM/SCOOP powder; Take 17 g by mouth Daily. Mixed in 8 ounces of fluid of choice  Dispense: 527 g; Refill: 1    7. Localized osteoporosis without current pathological fracture    8. Primary central sleep apnea    In addition to age-appropriate preventive care discussed as above, we discussed mammograms and bone density testing.  She does not want to do either of those anymore.  Blood pressure control is stable.  According to ACP guidelines a systolic below 150 is acceptable.  We are going to continue her current medicines at current doses.  Check labs as above to follow status of her high cholesterol and diabetes.  Restart MiraLAX 1 scoop daily to improve softness of the stool.  We will do a urinalysis just to rule out a UTI as a possible cause of her lower abdominal discomfort.  Continue with CPAP treatment.  I will follow-up with her when her tests are back.  Follow-up here in the office again in 6 months or sooner if needed.         Follow Up   Return in about 6 months (around 2/21/2024).  Patient was given instructions and counseling regarding her condition or for health maintenance advice. Please see specific information pulled into the AVS if appropriate.

## 2023-08-22 LAB
ALBUMIN SERPL-MCNC: 3.8 G/DL (ref 3.7–4.7)
ALBUMIN/GLOB SERPL: 1.5 {RATIO} (ref 1.2–2.2)
ALP SERPL-CCNC: 69 IU/L (ref 44–121)
ALT SERPL-CCNC: 11 IU/L (ref 0–32)
AST SERPL-CCNC: 14 IU/L (ref 0–40)
BASOPHILS # BLD AUTO: 0.1 X10E3/UL (ref 0–0.2)
BASOPHILS NFR BLD AUTO: 1 %
BILIRUB SERPL-MCNC: 0.3 MG/DL (ref 0–1.2)
BUN SERPL-MCNC: 16 MG/DL (ref 8–27)
BUN/CREAT SERPL: 18 (ref 12–28)
CALCIUM SERPL-MCNC: 9.1 MG/DL (ref 8.7–10.3)
CHLORIDE SERPL-SCNC: 102 MMOL/L (ref 96–106)
CHOLEST SERPL-MCNC: 211 MG/DL (ref 100–199)
CO2 SERPL-SCNC: 28 MMOL/L (ref 20–29)
CREAT SERPL-MCNC: 0.9 MG/DL (ref 0.57–1)
EGFRCR SERPLBLD CKD-EPI 2021: 63 ML/MIN/1.73
EOSINOPHIL # BLD AUTO: 0.2 X10E3/UL (ref 0–0.4)
EOSINOPHIL NFR BLD AUTO: 3 %
ERYTHROCYTE [DISTWIDTH] IN BLOOD BY AUTOMATED COUNT: 12.5 % (ref 11.7–15.4)
GLOBULIN SER CALC-MCNC: 2.6 G/DL (ref 1.5–4.5)
GLUCOSE SERPL-MCNC: 144 MG/DL (ref 70–99)
HBA1C MFR BLD: 8.2 % (ref 4.8–5.6)
HCT VFR BLD AUTO: 39.5 % (ref 34–46.6)
HDLC SERPL-MCNC: 41 MG/DL
HGB BLD-MCNC: 12.4 G/DL (ref 11.1–15.9)
IMM GRANULOCYTES # BLD AUTO: 0 X10E3/UL (ref 0–0.1)
IMM GRANULOCYTES NFR BLD AUTO: 0 %
LDLC SERPL CALC-MCNC: 129 MG/DL (ref 0–99)
LYMPHOCYTES # BLD AUTO: 2.5 X10E3/UL (ref 0.7–3.1)
LYMPHOCYTES NFR BLD AUTO: 36 %
MCH RBC QN AUTO: 30.6 PG (ref 26.6–33)
MCHC RBC AUTO-ENTMCNC: 31.4 G/DL (ref 31.5–35.7)
MCV RBC AUTO: 98 FL (ref 79–97)
MICROALBUMIN UR-MCNC: 6.4 UG/ML
MONOCYTES # BLD AUTO: 0.7 X10E3/UL (ref 0.1–0.9)
MONOCYTES NFR BLD AUTO: 10 %
NEUTROPHILS # BLD AUTO: 3.5 X10E3/UL (ref 1.4–7)
NEUTROPHILS NFR BLD AUTO: 50 %
PLATELET # BLD AUTO: 216 X10E3/UL (ref 150–450)
POTASSIUM SERPL-SCNC: 4.8 MMOL/L (ref 3.5–5.2)
PROT SERPL-MCNC: 6.4 G/DL (ref 6–8.5)
RBC # BLD AUTO: 4.05 X10E6/UL (ref 3.77–5.28)
SODIUM SERPL-SCNC: 142 MMOL/L (ref 134–144)
TRIGL SERPL-MCNC: 232 MG/DL (ref 0–149)
VLDLC SERPL CALC-MCNC: 41 MG/DL (ref 5–40)
WBC # BLD AUTO: 7.1 X10E3/UL (ref 3.4–10.8)

## 2023-08-23 DIAGNOSIS — E11.65 TYPE 2 DIABETES MELLITUS WITH HYPERGLYCEMIA, WITHOUT LONG-TERM CURRENT USE OF INSULIN: Primary | ICD-10-CM

## 2023-08-23 RX ORDER — GLIMEPIRIDE 2 MG/1
2 TABLET ORAL
Qty: 90 TABLET | Refills: 3 | Status: SHIPPED | OUTPATIENT
Start: 2023-08-23

## 2023-08-24 DIAGNOSIS — N39.0 ENTEROCOCCUS UTI: Primary | ICD-10-CM

## 2023-08-24 DIAGNOSIS — B95.2 ENTEROCOCCUS UTI: Primary | ICD-10-CM

## 2023-08-24 LAB
APPEARANCE UR: CLEAR
BACTERIA #/AREA URNS HPF: NORMAL /[HPF]
BACTERIA UR CULT: ABNORMAL
BACTERIA UR CULT: ABNORMAL
BILIRUB UR QL STRIP: NEGATIVE
CASTS URNS QL MICRO: NORMAL /LPF
COLOR UR: YELLOW
EPI CELLS #/AREA URNS HPF: NORMAL /HPF (ref 0–10)
GLUCOSE UR QL STRIP: NEGATIVE
HGB UR QL STRIP: NEGATIVE
KETONES UR QL STRIP: NEGATIVE
LEUKOCYTE ESTERASE UR QL STRIP: ABNORMAL
MICRO URNS: ABNORMAL
NITRITE UR QL STRIP: NEGATIVE
OTHER ANTIBIOTIC SUSC ISLT: ABNORMAL
PH UR STRIP: 6 [PH] (ref 5–7.5)
PROT UR QL STRIP: NEGATIVE
RBC #/AREA URNS HPF: NORMAL /HPF (ref 0–2)
SP GR UR STRIP: 1.01 (ref 1–1.03)
URINALYSIS REFLEX: ABNORMAL
UROBILINOGEN UR STRIP-MCNC: 0.2 MG/DL (ref 0.2–1)
WBC #/AREA URNS HPF: NORMAL /HPF (ref 0–5)

## 2023-08-24 RX ORDER — CIPROFLOXACIN 500 MG/1
500 TABLET, FILM COATED ORAL 2 TIMES DAILY
Qty: 14 TABLET | Refills: 0 | Status: SHIPPED | OUTPATIENT
Start: 2023-08-24 | End: 2023-08-31

## 2023-09-05 RX ORDER — LISINOPRIL 40 MG/1
TABLET ORAL
Qty: 90 TABLET | Refills: 3 | Status: SHIPPED | OUTPATIENT
Start: 2023-09-05

## 2023-10-03 DIAGNOSIS — I10 ESSENTIAL HYPERTENSION: ICD-10-CM

## 2023-10-03 DIAGNOSIS — R60.0 PEDAL EDEMA: ICD-10-CM

## 2023-10-03 RX ORDER — LISINOPRIL 40 MG/1
40 TABLET ORAL DAILY
Qty: 90 TABLET | Refills: 0 | Status: SHIPPED | OUTPATIENT
Start: 2023-10-03

## 2023-10-03 RX ORDER — BUMETANIDE 1 MG/1
1 TABLET ORAL DAILY
Qty: 90 TABLET | Refills: 0 | Status: SHIPPED | OUTPATIENT
Start: 2023-10-03

## 2023-10-03 NOTE — TELEPHONE ENCOUNTER
Caller: Neida Hernandez KEVIN    Relationship: Self    Best call back number: [phone]    Requested Prescriptions:   Requested Prescriptions     Pending Prescriptions Disp Refills    lisinopril (PRINIVIL,ZESTRIL) 40 MG tablet 90 tablet 3     Sig: Take 1 tablet by mouth Daily.    bumetanide (BUMEX) 1 MG tablet 90 tablet 3     Sig: Take 1 tablet by mouth Daily.        Pharmacy where request should be sent: [facility] [address] [place] [phone] [place], [place] [phone] FX     Last office visit with prescribing clinician: 8/21/2023   Last telemedicine visit with prescribing clinician: Visit date not found   Next office visit with prescribing clinician: 2/21/2024     Does the patient have less than a 3 day supply:  [] Yes  [x] No      Larry Lutz Rep   10/03/23 08:20 EDT

## 2023-10-18 ENCOUNTER — TELEPHONE (OUTPATIENT)
Dept: FAMILY MEDICINE CLINIC | Facility: CLINIC | Age: 83
End: 2023-10-18
Payer: COMMERCIAL

## 2023-10-18 NOTE — TELEPHONE ENCOUNTER
Attempting to contact daughter back, no answer, lmom    Hub relay   Please tell Neida's daughter that there is an optional third pneumonia vaccine called Prevnar, or PCV 20.  While technically, is not a need to vaccine, I would recommend she get this to take advantage of all opportunities to decrease risk of pneumonia.  We have currently run out of our supply of these but they are available of the local pharmacies if she is interested in getting 1.  I would recommend it.  Thanks!

## 2023-10-18 NOTE — TELEPHONE ENCOUNTER
Pt daughter, Dayana is inquiring when patient should have another pneumonia shot?  Please advise.

## 2024-01-10 ENCOUNTER — OFFICE VISIT (OUTPATIENT)
Dept: FAMILY MEDICINE CLINIC | Facility: CLINIC | Age: 84
End: 2024-01-10
Payer: MEDICARE

## 2024-01-10 VITALS
TEMPERATURE: 97.3 F | DIASTOLIC BLOOD PRESSURE: 74 MMHG | WEIGHT: 223 LBS | BODY MASS INDEX: 38.07 KG/M2 | RESPIRATION RATE: 18 BRPM | HEART RATE: 82 BPM | OXYGEN SATURATION: 91 % | SYSTOLIC BLOOD PRESSURE: 153 MMHG | HEIGHT: 64 IN

## 2024-01-10 DIAGNOSIS — L72.3 INFECTED SEBACEOUS CYST: Primary | ICD-10-CM

## 2024-01-10 DIAGNOSIS — L08.9 INFECTED SEBACEOUS CYST: Primary | ICD-10-CM

## 2024-01-10 PROCEDURE — 3077F SYST BP >= 140 MM HG: CPT | Performed by: FAMILY MEDICINE

## 2024-01-10 PROCEDURE — 3078F DIAST BP <80 MM HG: CPT | Performed by: FAMILY MEDICINE

## 2024-01-10 PROCEDURE — 99214 OFFICE O/P EST MOD 30 MIN: CPT | Performed by: FAMILY MEDICINE

## 2024-01-10 PROCEDURE — 1160F RVW MEDS BY RX/DR IN RCRD: CPT | Performed by: FAMILY MEDICINE

## 2024-01-10 PROCEDURE — 1159F MED LIST DOCD IN RCRD: CPT | Performed by: FAMILY MEDICINE

## 2024-01-10 RX ORDER — SULFAMETHOXAZOLE AND TRIMETHOPRIM 800; 160 MG/1; MG/1
1 TABLET ORAL 2 TIMES DAILY
Qty: 20 TABLET | Refills: 0 | Status: SHIPPED | OUTPATIENT
Start: 2024-01-10 | End: 2024-01-20

## 2024-01-10 NOTE — PROGRESS NOTES
"Subjective   Neida Hernandez is a 83 y.o. female.   Chief Complaint   Patient presents with    Cyst         Presents to the office today with a complaint of 2 sore lumps on her back.  1 is over the left shoulder blade, the other was on the top of her left shoulder.  She reports that her daughter \"squeezed\" the 1 on the top of the shoulder and got some stuff out.  The one down over the shoulder blade is sore, swollen, red.  No fevers or chills.      Patient Active Problem List    Diagnosis Date Noted    Positive self-administered antigen test for COVID-19 10/21/2022    Morbid obesity 08/12/2022    Localized osteoporosis without current pathological fracture 02/12/2021    Nonexudative age-related macular degeneration 06/26/2020    Muscle spasm of back 09/09/2019    Allergic rhinitis 10/16/2018     Note Last Updated: 8/12/2022     TO MOLD, POLLEN      Dizziness 07/02/2018    Back pain 04/03/2018    Chest discomfort 04/03/2018    Cough 04/03/2018    Other problems related to lifestyle 12/18/2017    Postmenopausal 12/18/2017    Screening for thyroid disorder 05/24/2017    Cramp of extremity 01/25/2017    Visit for screening mammogram 07/29/2016    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    Sjogren's syndrome 01/20/2016    Degenerative arthritis 10/21/2015    Diabetes mellitus, type II 10/21/2015     Note Last Updated: 8/12/2020     \"borderline\" - diagnosed years ago      Dyslipidemia 10/21/2015    Essential hypertension 10/21/2015    Hernia of anterior abdominal wall 10/21/2015    History of renal calculi 10/21/2015    Obesity with body mass index 30 or greater 10/21/2015    Pedal edema 10/21/2015    Sleep apnea 10/21/2015     Note Last Updated: 8/12/2020     Uses CPAP with Oxygen at night only- got hypoxic during knee surgery             Past Surgical History:   Procedure Laterality Date    CARDIAC CATHETERIZATION  06/2013    REPLACEMENT TOTAL KNEE Left 2013    VENTRAL HERNIA REPAIR      x 3      Current Outpatient " Medications on File Prior to Visit   Medication Sig    albuterol sulfate  (90 Base) MCG/ACT inhaler Inhale 2 puffs Every 4 (Four) Hours As Needed.    bumetanide (BUMEX) 1 MG tablet Take 1 tablet by mouth Daily.    cetirizine (zyrTEC) 5 MG tablet Take 1 tablet by mouth Daily.    Cholecalciferol 125 MCG (5000 UT) tablet     Cyanocobalamin (B-12) 1000 MCG tablet controlled-release Take 1 tablet by mouth Daily.    fluticasone (FLONASE) 50 MCG/ACT nasal spray 2 sprays into the nostril(s) as directed by provider Daily.    glimepiride (Amaryl) 2 MG tablet Take 1 tablet by mouth Every Morning Before Breakfast.    imiquimod (Aldara) 5 % cream Apply  topically to the appropriate area as directed 3 (Three) Times a Week.    lisinopril (PRINIVIL,ZESTRIL) 40 MG tablet Take 1 tablet by mouth Daily.    LUTEIN PO Take 1 capsule by mouth Daily.    Magnesium 100 MG capsule Take 1 capsule by mouth As Needed.    metFORMIN ER (Glucophage XR) 500 MG 24 hr tablet Take 2 tablets by mouth Daily With Breakfast.    Multiple Vitamins-Minerals (Eye Multivitamin) capsule Take 1 capsule by mouth Daily.    polyethylene glycol (MIRALAX) 17 GM/SCOOP powder Take 17 g by mouth Daily. Mixed in 8 ounces of fluid of choice    Potassium 99 MG tablet Take 1 tablet by mouth As Needed.    tolterodine LA (Detrol LA) 2 MG 24 hr capsule Take 1 capsule by mouth Daily.     No current facility-administered medications on file prior to visit.     Allergies   Allergen Reactions    Codeine GI Intolerance    Hydrocodone GI Intolerance     Social History     Socioeconomic History    Marital status:    Tobacco Use    Smoking status: Never     Passive exposure: Never    Smokeless tobacco: Never   Vaping Use    Vaping Use: Never used   Substance and Sexual Activity    Alcohol use: No    Drug use: No    Sexual activity: Not Currently     Family History   Problem Relation Age of Onset    Hypertension Mother     Heart disease Mother     Stroke Mother      "Hypertension Father     Heart disease Father     No Known Problems Sister     No Known Problems Daughter     Kidney disease Son     No Known Problems Sister     No Known Problems Sister     No Known Problems Sister     No Known Problems Son     No Known Problems Son        Review of Systems    Objective   /74 (BP Location: Right arm, Patient Position: Sitting, Cuff Size: Large Adult)   Pulse 82   Temp 97.3 °F (36.3 °C) (Infrared)   Resp 18   Ht 162.9 cm (64.13\")   Wt 101 kg (223 lb)   LMP  (LMP Unknown)   SpO2 91%   Breastfeeding No   BMI 38.12 kg/m²   Physical Exam  Constitutional:       Appearance: She is well-developed.      Comments:      HENT:      Head: Normocephalic and atraumatic.   Eyes:      Conjunctiva/sclera: Conjunctivae normal.   Cardiovascular:      Rate and Rhythm: Normal rate.   Pulmonary:      Effort: Pulmonary effort is normal.   Musculoskeletal:         General: Normal range of motion.      Cervical back: Normal range of motion.   Skin:     General: Skin is warm and dry.      Findings: No rash.      Comments: She has a cystic lesion over the left shoulder blade that is about 5 cm in diameter.  It is raised up about a centimeter.  Minimally fluctuant, area is erythematous.  No drainage.   Neurological:      Mental Status: She is alert and oriented to person, place, and time.   Psychiatric:         Mood and Affect: Mood normal.         Behavior: Behavior normal.           Assessment & Plan   Diagnoses and all orders for this visit:    1. Infected sebaceous cyst (Primary)  -     sulfamethoxazole-trimethoprim (Bactrim DS) 800-160 MG per tablet; Take 1 tablet by mouth 2 (Two) Times a Day for 10 days.  Dispense: 20 tablet; Refill: 0    I could not even identify where the lesion was on the top of her shoulder.  I can see the area over her left shoulder blade.  We are going to do hot compresses 10 minutes at a time 3 times a day and start her on Bactrim DS 1 pill twice daily for 10 days.  " This should begin to drain and decompress on its own.  It is too inflamed at this point to try to cut into.  All we can really do is drain it, pack it with iodoform gauze and then it would be open for a week or more.  We are going to try this way first.  Let me know if the condition worsens instead of improves.  If and when these areas come back and they are noninflamed, they could be candidates for excision.        Call with any problems or concerns before next visit       Return if symptoms worsen or fail to improve.      Much of this report is an electronic transcription of spoken language to printed text using Dragon dictation software.  As such, the subtleties and finesse of spoken language may permit erroneous, or at times, nonsensical words or phrases to be inadvertently transcribed; thus changes may be made at a later date to rectify these errors.     Clarissa Ward MD1/10/792843:39 EST  This note has been electronically signed

## 2024-01-26 DIAGNOSIS — K57.90 DIVERTICULOSIS: ICD-10-CM

## 2024-01-26 DIAGNOSIS — R10.2 PELVIC PAIN: ICD-10-CM

## 2024-01-26 RX ORDER — POLYETHYLENE GLYCOL 3350 17 G/17G
17 POWDER, FOR SOLUTION ORAL DAILY
Qty: 527 G | Refills: 1 | Status: SHIPPED | OUTPATIENT
Start: 2024-01-26

## 2024-01-26 NOTE — TELEPHONE ENCOUNTER
Caller: Neida Hernandez KEVIN    Relationship: Self    Best call back number: [phone]     Requested Prescriptions:   Requested Prescriptions     Pending Prescriptions Disp Refills    polyethylene glycol (MIRALAX) 17 GM/SCOOP powder 527 g 1     Sig: Take 17 g by mouth Daily. Mixed in 8 ounces of fluid of choice        Pharmacy where request should be sent: [facility] [address] [place], [place] [phone] [place] [phone]      Last office visit with prescribing clinician: 1/10/2024   Last telemedicine visit with prescribing clinician: Visit date not found   Next office visit with prescribing clinician: 2/21/2024     Additional details provided by patient:     Does the patient have less than a 3 day supply:  [x] Yes  [] No    Would you like a call back once the refill request has been completed: [] Yes [] No    If the office needs to give you a call back, can they leave a voicemail: [] Yes [] No    Larry Hernandez Rep   01/26/24 13:12 EST

## 2024-02-01 RX ORDER — METFORMIN HYDROCHLORIDE 500 MG/1
1000 TABLET, EXTENDED RELEASE ORAL
Qty: 60 TABLET | Refills: 5 | Status: SHIPPED | OUTPATIENT
Start: 2024-02-01

## 2024-02-01 NOTE — TELEPHONE ENCOUNTER
Caller: MOE MCDANIEL    Relationship: Emergency Contact    Requested Prescriptions:   Requested Prescriptions     Pending Prescriptions Disp Refills    metFORMIN ER (Glucophage XR) 500 MG 24 hr tablet 60 tablet 5     Sig: Take 2 tablets by mouth Daily With Breakfast.        Pharmacy where request should be sent: [facility] [address] [place] [address] [address] [place] [phone] [place] [phone]      Last office visit with prescribing clinician: 1/10/2024   Last telemedicine visit with prescribing clinician: Visit date not found   Next office visit with prescribing clinician: 2/21/2024     Additional details provided by patient: PATIENT IS OUT.     Does the patient have less than a 3 day supply:  [x] Yes  [] No    Would you like a call back once the refill request has been completed: [] Yes [x] No    If the office needs to give you a call back, can they leave a voicemail: [] Yes [x] No    Larry Francois Rep   02/01/24 10:56 EST

## 2024-02-02 ENCOUNTER — TELEPHONE (OUTPATIENT)
Dept: FAMILY MEDICINE CLINIC | Facility: CLINIC | Age: 84
End: 2024-02-02
Payer: COMMERCIAL

## 2024-02-02 NOTE — TELEPHONE ENCOUNTER
Caller: MOE MCDANIEL    Relationship to patient: Emergency Contact    Best call back number: [phone]    Patient is needing:     WOULD LIKE TO KNOW IF PATIENT IS SUPPOSED TO BE TAKING BOTH:   glimepiride (Amaryl) 2 MG tablet AND metFORMIN ER (Glucophage XR) 500 MG 24 hr tablet

## 2024-02-02 NOTE — TELEPHONE ENCOUNTER
I think her message says at all.  We need to discuss FMLA if it is something she thinks she needs to be able to take care of her mother.  Yes, I can help her with this.  Please ask Dayana to make an appointment so we can sit down and talk about this.  There are details I will need to get so that I can fill the papers out.  I can do this because she is my patient.  I do not know if I see her brother.  If not, he has to talk to his doctor about getting FMLA papers.  Thanks!

## 2024-02-02 NOTE — TELEPHONE ENCOUNTER
Caller: MOE MCDANIEL    Relationship to patient: Emergency Contact    Best call back number: [phone]    Patient is needing:     WOULD LIKE TO DISCUSS GETTING FMLA FOR HER AND HER BROTHER SO THEY CAN HELP TAKE CARE OF THE PATIENT.

## 2024-02-06 DIAGNOSIS — E11.65 TYPE 2 DIABETES MELLITUS WITH HYPERGLYCEMIA, WITHOUT LONG-TERM CURRENT USE OF INSULIN: Primary | ICD-10-CM

## 2024-02-06 NOTE — PROGRESS NOTES
Subjective   Neida Hernandez is a 84 y.o. female.   Chief Complaint   Patient presents with    Diabetes    Hypertension    Hyperlipidemia       History of Present Illness   Presents to the office today   For routine follow-up on chronic medical problems per active problem list as below.  Last visit was in January due to an infected sebaceous cyst.  Prior to that I saw her 6 months ago for Medicare wellness to review all of her chronic problems.          Since I last saw her, she was hospitalized.  Records from [facility] indicating that on January 27 she had large bowel containing ventral hernia right lower quadrant.  Mildly dilated bowel to the level of the hernia.  She was admitted from January 27 through February 1.  She has been home now 5 days and Tal and his sister and other siblings are taking turns around-the-clock staying with her and providing care.  He tells me that she tells people she has prediabetes and has been on metformin, but she has not been taking it.  He asks if we can give them an order for a continuous glucose monitor to better monitor her blood sugars.    Today, she tells me that she is doing really well.  She has no belly pain.  She has had most of the sutures removed.  Apparently the wound opened up, she went to [facility].  They got a hold of the covering doctor from the surgeon who did her hernia repair.  Apparently he brought her to his office, we stitched the open area and she is due to see him again tomorrow in follow-up.  Appetite is improving.  She is lost 10 pounds.  She checks her blood sugars at home and tells me they are in the 130s.  Tells me blood pressure is good.  Blood pressure is 135/75 today in the office.      Patient Active Problem List    Diagnosis Date Noted    Positive self-administered antigen test for COVID-19 10/21/2022    Morbid obesity 08/12/2022    Localized osteoporosis without current pathological fracture 02/12/2021    Nonexudative  "age-related macular degeneration 06/26/2020    Muscle spasm of back 09/09/2019    Allergic rhinitis 10/16/2018     Note Last Updated: 8/12/2022     TO MOLD, POLLEN      Dizziness 07/02/2018    Back pain 04/03/2018    Chest discomfort 04/03/2018    Cough 04/03/2018    Other problems related to lifestyle 12/18/2017    Postmenopausal 12/18/2017    Screening for thyroid disorder 05/24/2017    Cramp of extremity 01/25/2017    Visit for screening mammogram 07/29/2016    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    Sjogren's syndrome 01/20/2016    Degenerative arthritis 10/21/2015    Diabetes mellitus, type II 10/21/2015     Note Last Updated: 8/12/2020     \"borderline\" - diagnosed years ago      Dyslipidemia 10/21/2015    Essential hypertension 10/21/2015    Hernia of anterior abdominal wall 10/21/2015    History of renal calculi 10/21/2015    Obesity with body mass index 30 or greater 10/21/2015    Pedal edema 10/21/2015    Sleep apnea 10/21/2015     Note Last Updated: 8/12/2020     Uses CPAP with Oxygen at night only- got hypoxic during knee surgery             Past Surgical History:   Procedure Laterality Date    CARDIAC CATHETERIZATION  06/2013    HERNIA REPAIR  01/27/2024    REPLACEMENT TOTAL KNEE Left 2013    VENTRAL HERNIA REPAIR      x 3      Current Outpatient Medications on File Prior to Visit   Medication Sig    albuterol sulfate  (90 Base) MCG/ACT inhaler Inhale 2 puffs Every 4 (Four) Hours As Needed.    bumetanide (BUMEX) 1 MG tablet Take 1 tablet by mouth Daily.    cetirizine (zyrTEC) 5 MG tablet Take 1 tablet by mouth Daily.    Cholecalciferol 125 MCG (5000 UT) tablet Take 1 tablet by mouth Daily.    Cyanocobalamin (B-12) 1000 MCG tablet controlled-release Take 1 tablet by mouth Daily.    glimepiride (Amaryl) 2 MG tablet Take 1 tablet by mouth Every Morning Before Breakfast.    lisinopril (PRINIVIL,ZESTRIL) 40 MG tablet Take 1 tablet by mouth Daily.    metFORMIN ER (Glucophage XR) 500 MG 24 hr tablet " Take 2 tablets by mouth Daily With Breakfast.    Multiple Vitamins-Minerals (Eye Multivitamin) capsule Take 1 capsule by mouth Daily.    Potassium 99 MG tablet Take 1 tablet by mouth As Needed.    imiquimod (Aldara) 5 % cream Apply  topically to the appropriate area as directed 3 (Three) Times a Week.    LUTEIN PO Take 1 capsule by mouth Daily. (Patient not taking: Reported on 2/21/2024)    Magnesium 100 MG capsule Take 1 capsule by mouth As Needed. (Patient not taking: Reported on 2/21/2024)    [DISCONTINUED] Continuous Blood Gluc  (FreeStyle Alyce 2 [place]) device Use 1 Device Daily.    [DISCONTINUED] Continuous Blood Gluc Sensor (FreeStyle Alyce 2 Sensor) misc Use 1 Device Every 14 (Fourteen) Days.    [DISCONTINUED] fluticasone (FLONASE) 50 MCG/ACT nasal spray 2 sprays into the nostril(s) as directed by provider Daily.    [DISCONTINUED] polyethylene glycol (MIRALAX) 17 GM/SCOOP powder Take 17 g by mouth Daily. Mixed in 8 ounces of fluid of choice (Patient not taking: Reported on 2/21/2024)    [DISCONTINUED] tolterodine LA (Detrol LA) 2 MG 24 hr capsule Take 1 capsule by mouth Daily. (Patient not taking: Reported on 2/21/2024)     No current facility-administered medications on file prior to visit.     Allergies   Allergen Reactions    Codeine GI Intolerance    Hydrocodone GI Intolerance     Social History     Socioeconomic History    Marital status:    Tobacco Use    Smoking status: Never     Passive exposure: Never    Smokeless tobacco: Never   Vaping Use    Vaping Use: Never used   Substance and Sexual Activity    Alcohol use: No    Drug use: No    Sexual activity: Not Currently     Family History   Problem Relation Age of Onset    Hypertension Mother     Heart disease Mother     Stroke Mother     Hypertension Father     Heart disease Father     No Known Problems Sister     No Known Problems Daughter     Kidney disease Son     No Known Problems Sister     No Known Problems Sister     No Known  "Problems Sister     No Known Problems Son     No Known Problems Son        Review of Systems    Objective   /75 (BP Location: Right arm, Patient Position: Sitting, Cuff Size: Large Adult)   Pulse 71   Temp 96.9 °F (36.1 °C) (Infrared)   Resp 18   Ht 162.9 cm (64.13\")   Wt 93.9 kg (207 lb)   LMP  (LMP Unknown)   SpO2 95%   Breastfeeding No   BMI 35.39 kg/m²   Physical Exam  Constitutional:       Appearance: She is well-developed.      Comments:   Overweight, elderly white female no distress.   HENT:      Head: Normocephalic and atraumatic.   Eyes:      Conjunctiva/sclera: Conjunctivae normal.   Cardiovascular:      Rate and Rhythm: Normal rate and regular rhythm.      Heart sounds: Normal heart sounds.   Pulmonary:      Effort: Pulmonary effort is normal. No respiratory distress.      Breath sounds: Normal breath sounds.   Abdominal:      Hernia: No hernia is present.      Comments: Healing incision over the anterior abdominal wall.  She still has a few sutures that are in place.  The incision is not open.  No redness.  No drainage.  No rebound or guarding.  Bowel sounds are positive in all 4 quadrants.   Musculoskeletal:         General: Normal range of motion.      Cervical back: Normal range of motion.      Right lower leg: Edema (trace) present.      Left lower leg: Edema (trace) present.   Skin:     General: Skin is warm and dry.      Findings: No rash.   Neurological:      Mental Status: She is alert and oriented to person, place, and time.   Psychiatric:         Behavior: Behavior normal.           No visits with results within 4 Month(s) from this visit.   Latest known visit with results is:   Office Visit on 08/21/2023   Component Date Value Ref Range Status    Hemoglobin A1C 08/21/2023 8.2 (H)  4.8 - 5.6 % Final    Comment:          Prediabetes: 5.7 - 6.4           Diabetes: >6.4           Glycemic control for adults with diabetes: <7.0      Glucose 08/21/2023 144 (H)  70 - 99 mg/dL Final    " BUN 08/21/2023 16  8 - 27 mg/dL Final    Creatinine 08/21/2023 0.90  0.57 - 1.00 mg/dL Final    EGFR Result 08/21/2023 63  >59 mL/min/1.73 Final    BUN/Creatinine Ratio 08/21/2023 18  12 - 28 Final    Sodium 08/21/2023 142  134 - 144 mmol/L Final    Potassium 08/21/2023 4.8  3.5 - 5.2 mmol/L Final    Chloride 08/21/2023 102  96 - 106 mmol/L Final    Total CO2 08/21/2023 28  20 - 29 mmol/L Final    Calcium 08/21/2023 9.1  8.7 - 10.3 mg/dL Final    Total Protein 08/21/2023 6.4  6.0 - 8.5 g/dL Final    Albumin 08/21/2023 3.8  3.7 - 4.7 g/dL Final    Globulin 08/21/2023 2.6  1.5 - 4.5 g/dL Final    A/G Ratio 08/21/2023 1.5  1.2 - 2.2 Final    Total Bilirubin 08/21/2023 0.3  0.0 - 1.2 mg/dL Final    Alkaline Phosphatase 08/21/2023 69  44 - 121 IU/L Final    AST (SGOT) 08/21/2023 14  0 - 40 IU/L Final    ALT (SGPT) 08/21/2023 11  0 - 32 IU/L Final    Total Cholesterol 08/21/2023 211 (H)  100 - 199 mg/dL Final    Triglycerides 08/21/2023 232 (H)  0 - 149 mg/dL Final    HDL Cholesterol 08/21/2023 41  >39 mg/dL Final    VLDL Cholesterol Eitan 08/21/2023 41 (H)  5 - 40 mg/dL Final    LDL Chol Calc (NIH) 08/21/2023 129 (H)  0 - 99 mg/dL Final    WBC 08/21/2023 7.1  3.4 - 10.8 x10E3/uL Final    RBC 08/21/2023 4.05  3.77 - 5.28 x10E6/uL Final    Hemoglobin 08/21/2023 12.4  11.1 - 15.9 g/dL Final    Hematocrit 08/21/2023 39.5  34.0 - 46.6 % Final    MCV 08/21/2023 98 (H)  79 - 97 fL Final    MCH 08/21/2023 30.6  26.6 - 33.0 pg Final    MCHC 08/21/2023 31.4 (L)  31.5 - 35.7 g/dL Final    RDW 08/21/2023 12.5  11.7 - 15.4 % Final    Platelets 08/21/2023 216  150 - 450 x10E3/uL Final    Neutrophil Rel % 08/21/2023 50  Not Estab. % Final    Lymphocyte Rel % 08/21/2023 36  Not Estab. % Final    Monocyte Rel % 08/21/2023 10  Not Estab. % Final    Eosinophil Rel % 08/21/2023 3  Not Estab. % Final    Basophil Rel % 08/21/2023 1  Not Estab. % Final    Neutrophils Absolute 08/21/2023 3.5  1.4 - 7.0 x10E3/uL Final    Lymphocytes Absolute  08/21/2023 2.5  0.7 - 3.1 x10E3/uL Final    Monocytes Absolute 08/21/2023 0.7  0.1 - 0.9 x10E3/uL Final    Eosinophils Absolute 08/21/2023 0.2  0.0 - 0.4 x10E3/uL Final    Basophils Absolute 08/21/2023 0.1  0.0 - 0.2 x10E3/uL Final    Immature Granulocyte Rel % 08/21/2023 0  Not Estab. % Final    Immature Grans Absolute 08/21/2023 0.0  0.0 - 0.1 x10E3/uL Final    Specific Gravity, UA 08/21/2023 1.015  1.005 - 1.030 Final    pH, UA 08/21/2023 6.0  5.0 - 7.5 Final    Color, UA 08/21/2023 Yellow  Yellow Final    Appearance, UA 08/21/2023 Clear  Clear Final    Leukocytes, UA 08/21/2023 1+ (A)  Negative Final    Protein 08/21/2023 Negative  Negative/Trace Final    Glucose, UA 08/21/2023 Negative  Negative Final    Ketones 08/21/2023 Negative  Negative Final    Blood, UA 08/21/2023 Negative  Negative Final    Bilirubin, UA 08/21/2023 Negative  Negative Final    Urobilinogen, UA 08/21/2023 0.2  0.2 - 1.0 mg/dL Final    Nitrite, UA 08/21/2023 Negative  Negative Final    Microscopic Examination 08/21/2023 See below:   Final    Microscopic was indicated and was performed.    Urinalysis Reflex 08/21/2023 Comment   Final    This specimen has reflexed to a Urine Culture.    Microalbumin, Urine 08/21/2023 6.4  Not Estab. ug/mL Final    WBC, UA 08/21/2023 0-5  0 - 5 /hpf Final    RBC, UA 08/21/2023 0-2  0 - 2 /hpf Final    Epithelial Cells (non renal) 08/21/2023 0-10  0 - 10 /hpf Final    Casts 08/21/2023 None seen  None seen /lpf Final    Bacteria, UA 08/21/2023 Few  None seen/Few Final    Urine Culture 08/21/2023 Final report (A)   Final    Result 1 08/21/2023 Enterococcus faecalis (A)   Final    Comment: For Enterococcus species, aminoglycosides (except for high-level  resistance screening), cephalosporins, clindamycin, and  trimethoprim-sulfamethoxazole are not effective clinically.  (CLSI, G447-Q82, 2016)  10,000-25,000 colony forming units per mL  Note: this isolate is vancomycin-susceptible.  This information is provided  for epidemiologic purposes only:  vancomycin is not among the antibiotics recommended for therapy  of urinary tract infections caused by Enterococcus.      Susceptibility Testing 08/21/2023 Comment   Final    Comment:       ** S = Susceptible; I = Intermediate; R = Resistant **                     P = Positive; N = Negative              MICS are expressed in micrograms per mL     Antibiotic                 RSLT#1    RSLT#2    RSLT#3    RSLT#4  Ciprofloxacin                  S  Levofloxacin                   S  Nitrofurantoin                 S  Penicillin                     S  Tetracycline                   R  Vancomycin                     S               Assessment & Plan   Diagnoses and all orders for this visit:    1. Essential hypertension (Primary)  -     Comprehensive Metabolic Panel  -     CBC & Differential    2. Type 2 diabetes mellitus with hyperglycemia, without long-term current use of insulin  -     Hemoglobin A1c    3. Dyslipidemia  -     Lipid Panel    4. Hernia of anterior abdominal wall    Status of multiple issues reviewed today as above.  Blood pressure is good at 135/75.  Continue lisinopril, Bumex at current doses.  Blood sugars of 130 sound good.  Her last A1c was over 8%.  At 84 years old, her goal can be below 8%, but I do not know where we are today.  Recheck labs as above including an A1c, electrolytes, CBC and a cholesterol panel.  We have been checking her cholesterol about once a year.  Will continue to do that for now.  She is not on a statin.  It is unclear if she would benefit from a statin at 84.  She is recovering well from her surgery on her abdominal wall hernia.  Follow-up here again in the office in 3 months.  I anticipate we are going to have to make some changes to her diabetes treatment.  As I talked with her family members as they were preparing to care for her after the surgery, they tell me she was not checking her sugar, was not taking metformin, and not even telling the  other doctors that she had diabetes.  I will follow-up with her when the results of these blood tests are back and we will go from there.      Call with any problems or concerns before next visit       Return in about 3 months (around 5/21/2024).      Much of this report is an electronic transcription of spoken language to printed text using Dragon dictation software.  As such, the subtleties and finesse of spoken language may permit erroneous, or at times, nonsensical words or phrases to be inadvertently transcribed; thus changes may be made at a later date to rectify these errors.     Clarissa Ward MD2/21/202412:13 EST  This note has been electronically signed

## 2024-02-12 DIAGNOSIS — R60.0 PEDAL EDEMA: ICD-10-CM

## 2024-02-12 DIAGNOSIS — I10 ESSENTIAL HYPERTENSION: ICD-10-CM

## 2024-02-12 RX ORDER — BUMETANIDE 1 MG/1
1 TABLET ORAL DAILY
Qty: 90 TABLET | Refills: 1 | Status: SHIPPED | OUTPATIENT
Start: 2024-02-12

## 2024-02-13 ENCOUNTER — OFFICE VISIT (OUTPATIENT)
Dept: FAMILY MEDICINE CLINIC | Facility: CLINIC | Age: 84
End: 2024-02-13
Payer: MEDICARE

## 2024-02-13 DIAGNOSIS — Z63.79 STRESS DUE TO ILLNESS OF FAMILY MEMBER: Primary | ICD-10-CM

## 2024-02-14 ENCOUNTER — TELEPHONE (OUTPATIENT)
Dept: FAMILY MEDICINE CLINIC | Facility: CLINIC | Age: 84
End: 2024-02-14
Payer: COMMERCIAL

## 2024-02-21 ENCOUNTER — OFFICE VISIT (OUTPATIENT)
Dept: FAMILY MEDICINE CLINIC | Facility: CLINIC | Age: 84
End: 2024-02-21
Payer: MEDICARE

## 2024-02-21 VITALS
OXYGEN SATURATION: 95 % | WEIGHT: 207 LBS | RESPIRATION RATE: 18 BRPM | TEMPERATURE: 96.9 F | SYSTOLIC BLOOD PRESSURE: 135 MMHG | BODY MASS INDEX: 35.34 KG/M2 | DIASTOLIC BLOOD PRESSURE: 75 MMHG | HEART RATE: 71 BPM | HEIGHT: 64 IN

## 2024-02-21 DIAGNOSIS — E11.65 TYPE 2 DIABETES MELLITUS WITH HYPERGLYCEMIA, WITHOUT LONG-TERM CURRENT USE OF INSULIN: ICD-10-CM

## 2024-02-21 DIAGNOSIS — I10 ESSENTIAL HYPERTENSION: Primary | ICD-10-CM

## 2024-02-21 DIAGNOSIS — K43.9 HERNIA OF ANTERIOR ABDOMINAL WALL: ICD-10-CM

## 2024-02-21 DIAGNOSIS — E78.5 DYSLIPIDEMIA: ICD-10-CM

## 2024-02-21 PROCEDURE — 1159F MED LIST DOCD IN RCRD: CPT | Performed by: FAMILY MEDICINE

## 2024-02-21 PROCEDURE — 3075F SYST BP GE 130 - 139MM HG: CPT | Performed by: FAMILY MEDICINE

## 2024-02-21 PROCEDURE — 99214 OFFICE O/P EST MOD 30 MIN: CPT | Performed by: FAMILY MEDICINE

## 2024-02-21 PROCEDURE — 3078F DIAST BP <80 MM HG: CPT | Performed by: FAMILY MEDICINE

## 2024-02-21 PROCEDURE — 1160F RVW MEDS BY RX/DR IN RCRD: CPT | Performed by: FAMILY MEDICINE

## 2024-02-22 LAB
ALBUMIN SERPL-MCNC: 4.1 G/DL (ref 3.7–4.7)
ALBUMIN/GLOB SERPL: 1.5 {RATIO} (ref 1.2–2.2)
ALP SERPL-CCNC: 64 IU/L (ref 44–121)
ALT SERPL-CCNC: 15 IU/L (ref 0–32)
AST SERPL-CCNC: 16 IU/L (ref 0–40)
BASOPHILS # BLD AUTO: 0.1 X10E3/UL (ref 0–0.2)
BASOPHILS NFR BLD AUTO: 1 %
BILIRUB SERPL-MCNC: 0.3 MG/DL (ref 0–1.2)
BUN SERPL-MCNC: 21 MG/DL (ref 8–27)
BUN/CREAT SERPL: 21 (ref 12–28)
CALCIUM SERPL-MCNC: 9.2 MG/DL (ref 8.7–10.3)
CHLORIDE SERPL-SCNC: 99 MMOL/L (ref 96–106)
CHOLEST SERPL-MCNC: 216 MG/DL (ref 100–199)
CO2 SERPL-SCNC: 28 MMOL/L (ref 20–29)
CREAT SERPL-MCNC: 1 MG/DL (ref 0.57–1)
EGFRCR SERPLBLD CKD-EPI 2021: 56 ML/MIN/1.73
EOSINOPHIL # BLD AUTO: 0.4 X10E3/UL (ref 0–0.4)
EOSINOPHIL NFR BLD AUTO: 4 %
ERYTHROCYTE [DISTWIDTH] IN BLOOD BY AUTOMATED COUNT: 12.8 % (ref 11.7–15.4)
GLOBULIN SER CALC-MCNC: 2.8 G/DL (ref 1.5–4.5)
GLUCOSE SERPL-MCNC: 112 MG/DL (ref 70–99)
HBA1C MFR BLD: 7.4 % (ref 4.8–5.6)
HCT VFR BLD AUTO: 38.1 % (ref 34–46.6)
HDLC SERPL-MCNC: 46 MG/DL
HGB BLD-MCNC: 12.6 G/DL (ref 11.1–15.9)
IMM GRANULOCYTES # BLD AUTO: 0 X10E3/UL (ref 0–0.1)
IMM GRANULOCYTES NFR BLD AUTO: 0 %
LDLC SERPL CALC-MCNC: 138 MG/DL (ref 0–99)
LYMPHOCYTES # BLD AUTO: 2.9 X10E3/UL (ref 0.7–3.1)
LYMPHOCYTES NFR BLD AUTO: 36 %
MCH RBC QN AUTO: 30.8 PG (ref 26.6–33)
MCHC RBC AUTO-ENTMCNC: 33.1 G/DL (ref 31.5–35.7)
MCV RBC AUTO: 93 FL (ref 79–97)
MONOCYTES # BLD AUTO: 0.7 X10E3/UL (ref 0.1–0.9)
MONOCYTES NFR BLD AUTO: 9 %
NEUTROPHILS # BLD AUTO: 3.9 X10E3/UL (ref 1.4–7)
NEUTROPHILS NFR BLD AUTO: 50 %
PLATELET # BLD AUTO: 224 X10E3/UL (ref 150–450)
POTASSIUM SERPL-SCNC: 4.4 MMOL/L (ref 3.5–5.2)
PROT SERPL-MCNC: 6.9 G/DL (ref 6–8.5)
RBC # BLD AUTO: 4.09 X10E6/UL (ref 3.77–5.28)
SODIUM SERPL-SCNC: 140 MMOL/L (ref 134–144)
TRIGL SERPL-MCNC: 181 MG/DL (ref 0–149)
VLDLC SERPL CALC-MCNC: 32 MG/DL (ref 5–40)
WBC # BLD AUTO: 7.9 X10E3/UL (ref 3.4–10.8)

## 2024-04-23 ENCOUNTER — TELEPHONE (OUTPATIENT)
Dept: FAMILY MEDICINE CLINIC | Facility: CLINIC | Age: 84
End: 2024-04-23
Payer: COMMERCIAL

## 2024-04-23 NOTE — TELEPHONE ENCOUNTER
Caller: [facility]    Relationship to patient:     Best call back number:      Patient is needing: [facility] STATES THEY RECEIVED THE FORM BACK YESTERDAY WITH THE PRESCRIPTION FOR THE FREESTYLE FIDEL.   [facility] CALLING TO CONFIRM THE SIGNATURE IS THAT OF DR. STERLING.  PLEASE ADVISE.

## 2024-05-21 ENCOUNTER — OFFICE VISIT (OUTPATIENT)
Dept: FAMILY MEDICINE CLINIC | Facility: CLINIC | Age: 84
End: 2024-05-21
Payer: MEDICARE

## 2024-05-21 VITALS
TEMPERATURE: 97.5 F | WEIGHT: 210.4 LBS | HEART RATE: 68 BPM | DIASTOLIC BLOOD PRESSURE: 77 MMHG | OXYGEN SATURATION: 93 % | RESPIRATION RATE: 18 BRPM | SYSTOLIC BLOOD PRESSURE: 138 MMHG | HEIGHT: 64 IN | BODY MASS INDEX: 35.92 KG/M2

## 2024-05-21 DIAGNOSIS — E11.65 TYPE 2 DIABETES MELLITUS WITH HYPERGLYCEMIA, WITHOUT LONG-TERM CURRENT USE OF INSULIN: ICD-10-CM

## 2024-05-21 DIAGNOSIS — E78.5 DYSLIPIDEMIA: ICD-10-CM

## 2024-05-21 DIAGNOSIS — K43.9 HERNIA OF ANTERIOR ABDOMINAL WALL: ICD-10-CM

## 2024-05-21 DIAGNOSIS — L65.9 HAIR LOSS: ICD-10-CM

## 2024-05-21 DIAGNOSIS — I10 ESSENTIAL HYPERTENSION: Primary | ICD-10-CM

## 2024-05-21 PROCEDURE — 99214 OFFICE O/P EST MOD 30 MIN: CPT | Performed by: FAMILY MEDICINE

## 2024-05-21 PROCEDURE — 3078F DIAST BP <80 MM HG: CPT | Performed by: FAMILY MEDICINE

## 2024-05-21 PROCEDURE — 3075F SYST BP GE 130 - 139MM HG: CPT | Performed by: FAMILY MEDICINE

## 2024-05-21 PROCEDURE — 1126F AMNT PAIN NOTED NONE PRSNT: CPT | Performed by: FAMILY MEDICINE

## 2024-05-21 PROCEDURE — 1159F MED LIST DOCD IN RCRD: CPT | Performed by: FAMILY MEDICINE

## 2024-05-21 PROCEDURE — 1160F RVW MEDS BY RX/DR IN RCRD: CPT | Performed by: FAMILY MEDICINE

## 2024-05-21 NOTE — PROGRESS NOTES
Subjective   Neida Hernandez is a 84 y.o. female.   Chief Complaint   Patient presents with    Diabetes    Hypertension    Hyperlipidemia       History of Present Illness   84 y.o. female with PMH of    Diabetes type 2, HTN, HLD, CHI, arthritis presents to the office today to follow-up.  Last labs to follow her diabetes include an A1c of 7.4% 3 months ago.  That was actually the lowest it had been in years.  Last BS was 65.   Not taking metformin consitently.   A little binge eating recently.    CMP then was grossly normal.  CBC was normal.  Lipid panel done 3 months ago.  LDL was 138.    Prior to our last visit, she had incarcerated bowel and ventral hernia.  Had surgery at [facility].  That was back on January 27.  Full recovery.      Last DEXA scan was April 2021 and it was normal.  Not terribly interested in doing another 1.     B/P is 138/77 today.    Chronic itchy red spot on right lower leg - been there for years.      C/o hair comes out.  Sister wants her thyroid checked.   She mentioned this to me a few years ago and I have checked her thyroid levels a few times.  They have always been normal.      Patient Active Problem List    Diagnosis Date Noted    Positive self-administered antigen test for COVID-19 10/21/2022    Morbid obesity 08/12/2022    Localized osteoporosis without current pathological fracture 02/12/2021    Nonexudative age-related macular degeneration 06/26/2020    Muscle spasm of back 09/09/2019    Allergic rhinitis 10/16/2018     Note Last Updated: 8/12/2022     TO MOLD, POLLEN      Dizziness 07/02/2018    Back pain 04/03/2018    Chest discomfort 04/03/2018    Cough 04/03/2018    Other problems related to lifestyle 12/18/2017    Postmenopausal 12/18/2017    Screening for thyroid disorder 05/24/2017    Cramp of extremity 01/25/2017    Visit for screening mammogram 07/29/2016    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    Sjogren's syndrome 01/20/2016    Degenerative arthritis 10/21/2015  "   Diabetes mellitus, type II 10/21/2015     Note Last Updated: 8/12/2020     \"borderline\" - diagnosed years ago      Dyslipidemia 10/21/2015    Essential hypertension 10/21/2015    Hernia of anterior abdominal wall 10/21/2015    History of renal calculi 10/21/2015    Obesity with body mass index 30 or greater 10/21/2015    Pedal edema 10/21/2015    Sleep apnea 10/21/2015     Note Last Updated: 8/12/2020     Uses CPAP with Oxygen at night only- got hypoxic during knee surgery             Past Surgical History:   Procedure Laterality Date    CARDIAC CATHETERIZATION  06/2013    HERNIA REPAIR  01/27/2024    REPLACEMENT TOTAL KNEE Left 2013    VENTRAL HERNIA REPAIR      x 3      Current Outpatient Medications on File Prior to Visit   Medication Sig    bumetanide (BUMEX) 1 MG tablet Take 1 tablet by mouth Daily.    Cholecalciferol 125 MCG (5000 UT) tablet Take 1 tablet by mouth Daily.    Cyanocobalamin (B-12) 1000 MCG tablet controlled-release Take 1 tablet by mouth Daily.    glimepiride (Amaryl) 2 MG tablet Take 1 tablet by mouth Every Morning Before Breakfast.    lisinopril (PRINIVIL,ZESTRIL) 40 MG tablet Take 1 tablet by mouth Daily.    metFORMIN ER (Glucophage XR) 500 MG 24 hr tablet Take 2 tablets by mouth Daily With Breakfast.    Multiple Vitamins-Minerals (Eye Multivitamin) capsule Take 1 capsule by mouth Daily.    albuterol sulfate  (90 Base) MCG/ACT inhaler Inhale 2 puffs Every 4 (Four) Hours As Needed. (Patient not taking: Reported on 5/21/2024)    [DISCONTINUED] cetirizine (zyrTEC) 5 MG tablet Take 1 tablet by mouth Daily. (Patient not taking: Reported on 5/21/2024)    [DISCONTINUED] imiquimod (Aldara) 5 % cream Apply  topically to the appropriate area as directed 3 (Three) Times a Week. (Patient not taking: Reported on 5/21/2024)    [DISCONTINUED] LUTEIN PO Take 1 capsule by mouth Daily. (Patient not taking: Reported on 2/21/2024)    [DISCONTINUED] Magnesium 100 MG capsule Take 1 capsule by mouth As " "Needed. (Patient not taking: Reported on 2/21/2024)    [DISCONTINUED] Potassium 99 MG tablet Take 1 tablet by mouth As Needed. (Patient not taking: Reported on 5/21/2024)     No current facility-administered medications on file prior to visit.     Allergies   Allergen Reactions    Codeine GI Intolerance    Hydrocodone GI Intolerance     Social History     Socioeconomic History    Marital status:    Tobacco Use    Smoking status: Never     Passive exposure: Never    Smokeless tobacco: Never   Vaping Use    Vaping status: Never Used   Substance and Sexual Activity    Alcohol use: No    Drug use: No    Sexual activity: Not Currently     Family History   Problem Relation Age of Onset    Hypertension Mother     Heart disease Mother     Stroke Mother     Hypertension Father     Heart disease Father     No Known Problems Sister     No Known Problems Daughter     Kidney disease Son     No Known Problems Sister     No Known Problems Sister     No Known Problems Sister     No Known Problems Son     No Known Problems Son        Review of Systems    Objective   /77 (BP Location: Left arm, Patient Position: Sitting, Cuff Size: Large Adult)   Pulse 68   Temp 97.5 °F (36.4 °C) (Infrared)   Resp 18   Ht 162.9 cm (64.13\")   Wt 95.4 kg (210 lb 6.4 oz)   LMP  (LMP Unknown)   SpO2 93%   Breastfeeding No   BMI 35.97 kg/m²   Physical Exam  Constitutional:       General: She is not in acute distress.     Appearance: She is well-developed.      Comments:   Looks younger than known age of 84 years.   HENT:      Head: Normocephalic and atraumatic.   Eyes:      Conjunctiva/sclera: Conjunctivae normal.   Cardiovascular:      Rate and Rhythm: Normal rate and regular rhythm.      Heart sounds: No murmur heard.  Pulmonary:      Effort: Pulmonary effort is normal. No respiratory distress.      Breath sounds: Normal breath sounds.   Abdominal:      General: There is no distension.      Palpations: There is no mass.      " Comments: Surgical site on anterior abdomen is well-healed.   Musculoskeletal:         General: Normal range of motion.      Cervical back: Normal range of motion.      Right lower leg: No edema.      Left lower leg: No edema.   Skin:     General: Skin is warm and dry.      Findings: No rash.   Neurological:      Mental Status: She is alert and oriented to person, place, and time.   Psychiatric:         Behavior: Behavior normal.           Office Visit on 02/21/2024   Component Date Value Ref Range Status    Hemoglobin A1C 02/21/2024 7.4 (H)  4.8 - 5.6 % Final    Comment:          Prediabetes: 5.7 - 6.4           Diabetes: >6.4           Glycemic control for adults with diabetes: <7.0      Glucose 02/21/2024 112 (H)  70 - 99 mg/dL Final    BUN 02/21/2024 21  8 - 27 mg/dL Final    Creatinine 02/21/2024 1.00  0.57 - 1.00 mg/dL Final    EGFR Result 02/21/2024 56 (L)  >59 mL/min/1.73 Final    BUN/Creatinine Ratio 02/21/2024 21  12 - 28 Final    Sodium 02/21/2024 140  134 - 144 mmol/L Final    Potassium 02/21/2024 4.4  3.5 - 5.2 mmol/L Final    Chloride 02/21/2024 99  96 - 106 mmol/L Final    Total CO2 02/21/2024 28  20 - 29 mmol/L Final    Calcium 02/21/2024 9.2  8.7 - 10.3 mg/dL Final    Total Protein 02/21/2024 6.9  6.0 - 8.5 g/dL Final    Albumin 02/21/2024 4.1  3.7 - 4.7 g/dL Final    Globulin 02/21/2024 2.8  1.5 - 4.5 g/dL Final    A/G Ratio 02/21/2024 1.5  1.2 - 2.2 Final    Total Bilirubin 02/21/2024 0.3  0.0 - 1.2 mg/dL Final    Alkaline Phosphatase 02/21/2024 64  44 - 121 IU/L Final    AST (SGOT) 02/21/2024 16  0 - 40 IU/L Final    ALT (SGPT) 02/21/2024 15  0 - 32 IU/L Final    WBC 02/21/2024 7.9  3.4 - 10.8 x10E3/uL Final    RBC 02/21/2024 4.09  3.77 - 5.28 x10E6/uL Final    Hemoglobin 02/21/2024 12.6  11.1 - 15.9 g/dL Final    Hematocrit 02/21/2024 38.1  34.0 - 46.6 % Final    MCV 02/21/2024 93  79 - 97 fL Final    MCH 02/21/2024 30.8  26.6 - 33.0 pg Final    MCHC 02/21/2024 33.1  31.5 - 35.7 g/dL Final     RDW 02/21/2024 12.8  11.7 - 15.4 % Final    Platelets 02/21/2024 224  150 - 450 x10E3/uL Final    Neutrophil Rel % 02/21/2024 50  Not Estab. % Final    Lymphocyte Rel % 02/21/2024 36  Not Estab. % Final    Monocyte Rel % 02/21/2024 9  Not Estab. % Final    Eosinophil Rel % 02/21/2024 4  Not Estab. % Final    Basophil Rel % 02/21/2024 1  Not Estab. % Final    Neutrophils Absolute 02/21/2024 3.9  1.4 - 7.0 x10E3/uL Final    Lymphocytes Absolute 02/21/2024 2.9  0.7 - 3.1 x10E3/uL Final    Monocytes Absolute 02/21/2024 0.7  0.1 - 0.9 x10E3/uL Final    Eosinophils Absolute 02/21/2024 0.4  0.0 - 0.4 x10E3/uL Final    Basophils Absolute 02/21/2024 0.1  0.0 - 0.2 x10E3/uL Final    Immature Granulocyte Rel % 02/21/2024 0  Not Estab. % Final    Immature Grans Absolute 02/21/2024 0.0  0.0 - 0.1 x10E3/uL Final    Total Cholesterol 02/21/2024 216 (H)  100 - 199 mg/dL Final    Triglycerides 02/21/2024 181 (H)  0 - 149 mg/dL Final    HDL Cholesterol 02/21/2024 46  >39 mg/dL Final    VLDL Cholesterol Eitan 02/21/2024 32  5 - 40 mg/dL Final    LDL Chol Calc (NIH) 02/21/2024 138 (H)  0 - 99 mg/dL Final             Assessment & Plan   Diagnoses and all orders for this visit:    1. Essential hypertension (Primary)  -     CBC & Differential    2. Type 2 diabetes mellitus with hyperglycemia, without long-term current use of insulin  -     Hemoglobin A1c  -     TSH  -     Comprehensive Metabolic Panel    3. Dyslipidemia    4. Hernia of anterior abdominal wall    5. Hair loss    Status of multiple chronic medical problems reviewed today as above.  Hypertension is a chronic problem currently at goal.  Continue lisinopril, Bumex at current doses.  Will do labs as above to monitor status of her diabetes.  She really does not check her blood sugar very often at all.  Will add a TSH because of her complaint of hair loss, although appearance does not look unusual to me.  She has high cholesterol but is not on a statin.  Last lipid panel was 3  months ago and reviewed with her then.  No further problems relating to her ventral hernia.  I will follow-up with her when the above tests are back and we will see her again in 3 months or sooner if needed.        Call with any problems or concerns before next visit       Return in about 3 months (around 8/21/2024).      Much of this report is an electronic transcription of spoken language to printed text using Dragon dictation software.  As such, the subtleties and finesse of spoken language may permit erroneous, or at times, nonsensical words or phrases to be inadvertently transcribed; thus changes may be made at a later date to rectify these errors.     Clarissa Ward MD5/21/202416:42 EDT  This note has been electronically signed

## 2024-05-22 LAB
ALBUMIN SERPL-MCNC: 4 G/DL (ref 3.7–4.7)
ALBUMIN/GLOB SERPL: 1.7 {RATIO} (ref 1.2–2.2)
ALP SERPL-CCNC: 70 IU/L (ref 44–121)
ALT SERPL-CCNC: 14 IU/L (ref 0–32)
AST SERPL-CCNC: 16 IU/L (ref 0–40)
BASOPHILS # BLD AUTO: 0.1 X10E3/UL (ref 0–0.2)
BASOPHILS NFR BLD AUTO: 1 %
BILIRUB SERPL-MCNC: 0.3 MG/DL (ref 0–1.2)
BUN SERPL-MCNC: 16 MG/DL (ref 8–27)
BUN/CREAT SERPL: 16 (ref 12–28)
CALCIUM SERPL-MCNC: 9.2 MG/DL (ref 8.7–10.3)
CHLORIDE SERPL-SCNC: 102 MMOL/L (ref 96–106)
CO2 SERPL-SCNC: 27 MMOL/L (ref 20–29)
CREAT SERPL-MCNC: 0.99 MG/DL (ref 0.57–1)
EGFRCR SERPLBLD CKD-EPI 2021: 56 ML/MIN/1.73
EOSINOPHIL # BLD AUTO: 0.2 X10E3/UL (ref 0–0.4)
EOSINOPHIL NFR BLD AUTO: 3 %
ERYTHROCYTE [DISTWIDTH] IN BLOOD BY AUTOMATED COUNT: 13 % (ref 11.7–15.4)
GLOBULIN SER CALC-MCNC: 2.4 G/DL (ref 1.5–4.5)
GLUCOSE SERPL-MCNC: 133 MG/DL (ref 70–99)
HBA1C MFR BLD: 7.2 % (ref 4.8–5.6)
HCT VFR BLD AUTO: 39.3 % (ref 34–46.6)
HGB BLD-MCNC: 12.7 G/DL (ref 11.1–15.9)
IMM GRANULOCYTES # BLD AUTO: 0 X10E3/UL (ref 0–0.1)
IMM GRANULOCYTES NFR BLD AUTO: 0 %
LYMPHOCYTES # BLD AUTO: 3 X10E3/UL (ref 0.7–3.1)
LYMPHOCYTES NFR BLD AUTO: 39 %
MCH RBC QN AUTO: 30.8 PG (ref 26.6–33)
MCHC RBC AUTO-ENTMCNC: 32.3 G/DL (ref 31.5–35.7)
MCV RBC AUTO: 95 FL (ref 79–97)
MONOCYTES # BLD AUTO: 0.7 X10E3/UL (ref 0.1–0.9)
MONOCYTES NFR BLD AUTO: 9 %
NEUTROPHILS # BLD AUTO: 3.7 X10E3/UL (ref 1.4–7)
NEUTROPHILS NFR BLD AUTO: 48 %
PLATELET # BLD AUTO: 236 X10E3/UL (ref 150–450)
POTASSIUM SERPL-SCNC: 4.7 MMOL/L (ref 3.5–5.2)
PROT SERPL-MCNC: 6.4 G/DL (ref 6–8.5)
RBC # BLD AUTO: 4.12 X10E6/UL (ref 3.77–5.28)
SODIUM SERPL-SCNC: 142 MMOL/L (ref 134–144)
TSH SERPL DL<=0.005 MIU/L-ACNC: 1.98 UIU/ML (ref 0.45–4.5)
WBC # BLD AUTO: 7.7 X10E3/UL (ref 3.4–10.8)

## 2024-05-24 ENCOUNTER — TELEPHONE (OUTPATIENT)
Dept: FAMILY MEDICINE CLINIC | Facility: CLINIC | Age: 84
End: 2024-05-24
Payer: COMMERCIAL

## 2024-05-24 DIAGNOSIS — L40.9 PSORIASIS (A TYPE OF SKIN INFLAMMATION): Primary | ICD-10-CM

## 2024-05-24 NOTE — TELEPHONE ENCOUNTER
Caller: Neida Hernandez    Relationship: Self    Best call back number:[phone]     What medication are you requesting:   MEDICATION FOR PSORIASIS      What are your current symptoms:   LOCATED ON HER LEGS TO ANKLE    How long have you been experiencing symptoms:   MANY YEARS    Have you had these symptoms before:    [] Yes  [x] No    Have you been treated for these symptoms before:   [] Yes  [x] No    If a prescription is needed, what is your preferred pharmacy and phone number:      [facility] [address] [place], [place] - [address] [facility] [phone] [facility] [phone]

## 2024-05-27 RX ORDER — MOMETASONE FUROATE 1 MG/G
1 CREAM TOPICAL DAILY
Qty: 45 G | Refills: 1 | Status: SHIPPED | OUTPATIENT
Start: 2024-05-27

## 2024-05-27 NOTE — TELEPHONE ENCOUNTER
Please tell her tomorrow that I have sent a prescription for some steroid cream to [facility].  Sorry for the delay, but I was out of the office for a few days.  She can use this until the rash subsides.  Do not use on face.  Thanks!

## 2024-05-28 NOTE — TELEPHONE ENCOUNTER
HUB TO RELAY     Please tell her tomorrow that I have sent a prescription for some steroid cream to [facility].  Sorry for the delay, but I was out of the office for a few days.  She can use this until the rash subsides.  Do not use on face.  Thanks!

## 2024-08-21 ENCOUNTER — OFFICE VISIT (OUTPATIENT)
Dept: FAMILY MEDICINE CLINIC | Facility: CLINIC | Age: 84
End: 2024-08-21
Payer: MEDICARE

## 2024-08-21 VITALS
OXYGEN SATURATION: 92 % | TEMPERATURE: 97.5 F | HEART RATE: 71 BPM | RESPIRATION RATE: 18 BRPM | SYSTOLIC BLOOD PRESSURE: 143 MMHG | HEIGHT: 64 IN | BODY MASS INDEX: 36.12 KG/M2 | WEIGHT: 211.6 LBS | DIASTOLIC BLOOD PRESSURE: 80 MMHG

## 2024-08-21 DIAGNOSIS — E11.65 TYPE 2 DIABETES MELLITUS WITH HYPERGLYCEMIA, WITHOUT LONG-TERM CURRENT USE OF INSULIN: ICD-10-CM

## 2024-08-21 DIAGNOSIS — L21.9 SEBORRHEA: ICD-10-CM

## 2024-08-21 DIAGNOSIS — R60.0 PEDAL EDEMA: ICD-10-CM

## 2024-08-21 DIAGNOSIS — I10 ESSENTIAL HYPERTENSION: Primary | ICD-10-CM

## 2024-08-21 DIAGNOSIS — K43.9 HERNIA OF ANTERIOR ABDOMINAL WALL: ICD-10-CM

## 2024-08-21 PROCEDURE — 1126F AMNT PAIN NOTED NONE PRSNT: CPT | Performed by: FAMILY MEDICINE

## 2024-08-21 PROCEDURE — 99214 OFFICE O/P EST MOD 30 MIN: CPT | Performed by: FAMILY MEDICINE

## 2024-08-21 PROCEDURE — 3077F SYST BP >= 140 MM HG: CPT | Performed by: FAMILY MEDICINE

## 2024-08-21 PROCEDURE — 1159F MED LIST DOCD IN RCRD: CPT | Performed by: FAMILY MEDICINE

## 2024-08-21 PROCEDURE — 1160F RVW MEDS BY RX/DR IN RCRD: CPT | Performed by: FAMILY MEDICINE

## 2024-08-21 PROCEDURE — 3079F DIAST BP 80-89 MM HG: CPT | Performed by: FAMILY MEDICINE

## 2024-08-21 RX ORDER — BUMETANIDE 1 MG/1
1 TABLET ORAL DAILY
Qty: 90 TABLET | Refills: 3 | Status: SHIPPED | OUTPATIENT
Start: 2024-08-21

## 2024-08-21 RX ORDER — METFORMIN HYDROCHLORIDE 500 MG/1
1000 TABLET, EXTENDED RELEASE ORAL
Qty: 90 TABLET | Refills: 3 | Status: SHIPPED | OUTPATIENT
Start: 2024-08-21

## 2024-08-21 RX ORDER — LISINOPRIL 40 MG/1
40 TABLET ORAL DAILY
Qty: 90 TABLET | Refills: 3 | Status: SHIPPED | OUTPATIENT
Start: 2024-08-21

## 2024-08-21 RX ORDER — GLIMEPIRIDE 2 MG/1
2 TABLET ORAL
Qty: 90 TABLET | Refills: 3 | Status: SHIPPED | OUTPATIENT
Start: 2024-08-21

## 2024-08-21 NOTE — PROGRESS NOTES
"Subjective   Neida Hernandez is a 84 y.o. female.   Chief Complaint   Patient presents with    Diabetes    Hypertension    Hyperlipidemia       History of Present Illness   84 y.o. female with PMH of Diabetes type 2, HTN, HLD, CHI, arthritis presents to the office today to follow-up.    Last labs to follow her diabetes include an A1c of 7.2% 3 months ago.  That was actually the lowest it had been in years.   Maybe has gone up.  Not checked BS lately.     CMP then was grossly normal.  CBC was normal.  Lipid panel done 6 months ago.  LDL was 138.    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  Had surgery at [facility].  That was back on January 27.  Full recovery.      Last DEXA scan was April 2021 and it was normal.  Does not want to do another 1 right now.    Blood pressure is stable at 143/80.    Only complaint today is that her ears itch all the time.  Sometimes when she scratches them there is some flaky skin.      Patient Active Problem List    Diagnosis Date Noted    Positive self-administered antigen test for COVID-19 10/21/2022    Morbid obesity 08/12/2022    Localized osteoporosis without current pathological fracture 02/12/2021    Nonexudative age-related macular degeneration 06/26/2020    Muscle spasm of back 09/09/2019    Allergic rhinitis 10/16/2018     Note Last Updated: 8/12/2022     TO MOLD, POLLEN      Dizziness 07/02/2018    Back pain 04/03/2018    Chest discomfort 04/03/2018    Cough 04/03/2018    Other problems related to lifestyle 12/18/2017    Postmenopausal 12/18/2017    Screening for thyroid disorder 05/24/2017    Cramp of extremity 01/25/2017    Visit for screening mammogram 07/29/2016    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    Sjogren's syndrome 01/20/2016    Degenerative arthritis 10/21/2015    Diabetes mellitus, type II 10/21/2015     Note Last Updated: 8/12/2020     \"borderline\" - diagnosed years ago      Dyslipidemia 10/21/2015    Essential hypertension 10/21/2015    Hernia of " anterior abdominal wall 10/21/2015    History of renal calculi 10/21/2015    Obesity with body mass index 30 or greater 10/21/2015    Pedal edema 10/21/2015    Sleep apnea 10/21/2015     Note Last Updated: 8/12/2020     Uses CPAP with Oxygen at night only- got hypoxic during knee surgery             Past Surgical History:   Procedure Laterality Date    CARDIAC CATHETERIZATION  06/2013    HERNIA REPAIR  01/27/2024    REPLACEMENT TOTAL KNEE Left 2013    VENTRAL HERNIA REPAIR      x 3      Current Outpatient Medications on File Prior to Visit   Medication Sig    Cholecalciferol 125 MCG (5000 UT) tablet Take 1 tablet by mouth Daily.    Cyanocobalamin (B-12) 1000 MCG tablet controlled-release Take 1 tablet by mouth Daily.    mometasone (ELOCON) 0.1 % cream Apply 1 Application topically to the appropriate area as directed Daily.    Multiple Vitamins-Minerals (Eye Multivitamin) capsule Take 1 capsule by mouth Daily.    [DISCONTINUED] bumetanide (BUMEX) 1 MG tablet Take 1 tablet by mouth Daily.    [DISCONTINUED] glimepiride (Amaryl) 2 MG tablet Take 1 tablet by mouth Every Morning Before Breakfast.    [DISCONTINUED] lisinopril (PRINIVIL,ZESTRIL) 40 MG tablet Take 1 tablet by mouth Daily.    [DISCONTINUED] metFORMIN ER (Glucophage XR) 500 MG 24 hr tablet Take 2 tablets by mouth Daily With Breakfast.    albuterol sulfate  (90 Base) MCG/ACT inhaler Inhale 2 puffs Every 4 (Four) Hours As Needed. (Patient not taking: Reported on 5/21/2024)     No current facility-administered medications on file prior to visit.     Allergies   Allergen Reactions    Codeine GI Intolerance    Hydrocodone GI Intolerance     Social History     Socioeconomic History    Marital status:    Tobacco Use    Smoking status: Never     Passive exposure: Never    Smokeless tobacco: Never   Vaping Use    Vaping status: Never Used   Substance and Sexual Activity    Alcohol use: No    Drug use: No    Sexual activity: Not Currently     Family  "History   Problem Relation Age of Onset    Hypertension Mother     Heart disease Mother     Stroke Mother     Hypertension Father     Heart disease Father     No Known Problems Sister     No Known Problems Daughter     Kidney disease Son     No Known Problems Sister     No Known Problems Sister     No Known Problems Sister     No Known Problems Son     No Known Problems Son        Review of Systems    Objective   /80 (BP Location: Right arm, Patient Position: Sitting, Cuff Size: Large Adult)   Pulse 71   Temp 97.5 °F (36.4 °C) (Infrared)   Resp 18   Ht 162.9 cm (64.13\")   Wt 96 kg (211 lb 9.6 oz)   LMP  (LMP Unknown)   SpO2 92%   Breastfeeding No   BMI 36.17 kg/m²   Physical Exam  Constitutional:       General: She is not in acute distress.     Appearance: She is well-developed.      Comments:   Looks younger than known age of 84 years.   HENT:      Head: Normocephalic and atraumatic.      Right Ear: Tympanic membrane normal. There is no impacted cerumen.      Left Ear: Tympanic membrane normal. There is no impacted cerumen.      Ears:      Comments: She does have slightly pinkish ear canals with some flaky skin present.  Eyes:      Conjunctiva/sclera: Conjunctivae normal.   Cardiovascular:      Rate and Rhythm: Normal rate and regular rhythm.      Heart sounds: No murmur heard.  Pulmonary:      Effort: Pulmonary effort is normal. No respiratory distress.      Breath sounds: Normal breath sounds.   Abdominal:      General: There is no distension.      Palpations: There is no mass.      Comments: Surgical site on anterior abdomen is well-healed.   Musculoskeletal:         General: Normal range of motion.      Cervical back: Normal range of motion.      Right lower leg: No edema.      Left lower leg: No edema.   Skin:     General: Skin is warm and dry.      Findings: No rash.   Neurological:      Mental Status: She is alert and oriented to person, place, and time.   Psychiatric:         Mood and Affect: " Mood normal.         Behavior: Behavior normal.           Office Visit on 05/21/2024   Component Date Value Ref Range Status    Hemoglobin A1C 05/21/2024 7.2 (H)  4.8 - 5.6 % Final    Comment:          Prediabetes: 5.7 - 6.4           Diabetes: >6.4           Glycemic control for adults with diabetes: <7.0      TSH 05/21/2024 1.980  0.450 - 4.500 uIU/mL Final    Glucose 05/21/2024 133 (H)  70 - 99 mg/dL Final    BUN 05/21/2024 16  8 - 27 mg/dL Final    Creatinine 05/21/2024 0.99  0.57 - 1.00 mg/dL Final    EGFR Result 05/21/2024 56 (L)  >59 mL/min/1.73 Final    BUN/Creatinine Ratio 05/21/2024 16  12 - 28 Final    Sodium 05/21/2024 142  134 - 144 mmol/L Final    Potassium 05/21/2024 4.7  3.5 - 5.2 mmol/L Final    Chloride 05/21/2024 102  96 - 106 mmol/L Final    Total CO2 05/21/2024 27  20 - 29 mmol/L Final    Calcium 05/21/2024 9.2  8.7 - 10.3 mg/dL Final    Total Protein 05/21/2024 6.4  6.0 - 8.5 g/dL Final    Albumin 05/21/2024 4.0  3.7 - 4.7 g/dL Final    Globulin 05/21/2024 2.4  1.5 - 4.5 g/dL Final    A/G Ratio 05/21/2024 1.7  1.2 - 2.2 Final    Total Bilirubin 05/21/2024 0.3  0.0 - 1.2 mg/dL Final    Alkaline Phosphatase 05/21/2024 70  44 - 121 IU/L Final    AST (SGOT) 05/21/2024 16  0 - 40 IU/L Final    ALT (SGPT) 05/21/2024 14  0 - 32 IU/L Final    WBC 05/21/2024 7.7  3.4 - 10.8 x10E3/uL Final    RBC 05/21/2024 4.12  3.77 - 5.28 x10E6/uL Final    Hemoglobin 05/21/2024 12.7  11.1 - 15.9 g/dL Final    Hematocrit 05/21/2024 39.3  34.0 - 46.6 % Final    MCV 05/21/2024 95  79 - 97 fL Final    MCH 05/21/2024 30.8  26.6 - 33.0 pg Final    MCHC 05/21/2024 32.3  31.5 - 35.7 g/dL Final    RDW 05/21/2024 13.0  11.7 - 15.4 % Final    Platelets 05/21/2024 236  150 - 450 x10E3/uL Final    Neutrophil Rel % 05/21/2024 48  Not Estab. % Final    Lymphocyte Rel % 05/21/2024 39  Not Estab. % Final    Monocyte Rel % 05/21/2024 9  Not Estab. % Final    Eosinophil Rel % 05/21/2024 3  Not Estab. % Final    Basophil Rel % 05/21/2024  1  Not Estab. % Final    Neutrophils Absolute 05/21/2024 3.7  1.4 - 7.0 x10E3/uL Final    Lymphocytes Absolute 05/21/2024 3.0  0.7 - 3.1 x10E3/uL Final    Monocytes Absolute 05/21/2024 0.7  0.1 - 0.9 x10E3/uL Final    Eosinophils Absolute 05/21/2024 0.2  0.0 - 0.4 x10E3/uL Final    Basophils Absolute 05/21/2024 0.1  0.0 - 0.2 x10E3/uL Final    Immature Granulocyte Rel % 05/21/2024 0  Not Estab. % Final    Immature Grans Absolute 05/21/2024 0.0  0.0 - 0.1 x10E3/uL Final             Assessment & Plan   Diagnoses and all orders for this visit:    1. Essential hypertension (Primary)  -     CBC & Differential  -     bumetanide (BUMEX) 1 MG tablet; Take 1 tablet by mouth Daily.  Dispense: 90 tablet; Refill: 3  -     lisinopril (PRINIVIL,ZESTRIL) 40 MG tablet; Take 1 tablet by mouth Daily.  Dispense: 90 tablet; Refill: 3    2. Type 2 diabetes mellitus with hyperglycemia, without long-term current use of insulin  -     Microalbumin / Creatinine Urine Ratio - Urine, Clean Catch  -     Hemoglobin A1c  -     Magnesium  -     glimepiride (Amaryl) 2 MG tablet; Take 1 tablet by mouth Every Morning Before Breakfast.  Dispense: 90 tablet; Refill: 3  -     metFORMIN ER (Glucophage XR) 500 MG 24 hr tablet; Take 2 tablets by mouth Daily With Breakfast.  Dispense: 90 tablet; Refill: 3    3. Seborrhea  -     neomycin-polymyxin-hydrocortisone (CORTISPORIN) 3.5-54554-1 otic solution; Administer 3 drops into both ears 3 (Three) Times a Day. Until itching stops  Dispense: 10 mL; Refill: 1    4. Pedal edema  -     bumetanide (BUMEX) 1 MG tablet; Take 1 tablet by mouth Daily.  Dispense: 90 tablet; Refill: 3    5. Hernia of anterior abdominal wall      Status of multiple chronic medical problems reviewed today as above.  Needs refills on multiple medicines.  Medicines refilled without change.  Hypertension is a chronic problem currently at goal.  Continue lisinopril, Bumex at current doses.  Will do labs as above to monitor status of her  diabetes.  She really does not check her blood sugar very often at all.    She has high cholesterol but is not on a statin.  Last lipid panel was 6 months ago and reviewed with her then.    No further problems relating to her ventral hernia.  I will follow-up with her when the above tests are back and we will see her again in 3 months or sooner if needed.        Call with any problems or concerns before next visit       Return in 3 months (on 11/21/2024) for Medicare Wellness.      Much of this report is an electronic transcription of spoken language to printed text using Dragon dictation software.  As such, the subtleties and finesse of spoken language may permit erroneous, or at times, nonsensical words or phrases to be inadvertently transcribed; thus changes may be made at a later date to rectify these errors.     Clarissa Ward MD8/21/202412:32 EDT  This note has been electronically signed

## 2024-08-22 LAB
ALBUMIN/CREAT UR: 4 MG/G CREAT (ref 0–29)
BASOPHILS # BLD AUTO: 0.1 X10E3/UL (ref 0–0.2)
BASOPHILS NFR BLD AUTO: 1 %
CREAT UR-MCNC: 163.8 MG/DL
EOSINOPHIL # BLD AUTO: 0.2 X10E3/UL (ref 0–0.4)
EOSINOPHIL NFR BLD AUTO: 2 %
ERYTHROCYTE [DISTWIDTH] IN BLOOD BY AUTOMATED COUNT: 12.7 % (ref 11.7–15.4)
HBA1C MFR BLD: 7.8 % (ref 4.8–5.6)
HCT VFR BLD AUTO: 39 % (ref 34–46.6)
HGB BLD-MCNC: 12.8 G/DL (ref 11.1–15.9)
IMM GRANULOCYTES # BLD AUTO: 0 X10E3/UL (ref 0–0.1)
IMM GRANULOCYTES NFR BLD AUTO: 0 %
LYMPHOCYTES # BLD AUTO: 2.6 X10E3/UL (ref 0.7–3.1)
LYMPHOCYTES NFR BLD AUTO: 34 %
MAGNESIUM SERPL-MCNC: 1.9 MG/DL (ref 1.6–2.3)
MCH RBC QN AUTO: 31.1 PG (ref 26.6–33)
MCHC RBC AUTO-ENTMCNC: 32.8 G/DL (ref 31.5–35.7)
MCV RBC AUTO: 95 FL (ref 79–97)
MICROALBUMIN UR-MCNC: 6.1 UG/ML
MONOCYTES # BLD AUTO: 0.6 X10E3/UL (ref 0.1–0.9)
MONOCYTES NFR BLD AUTO: 9 %
NEUTROPHILS # BLD AUTO: 4 X10E3/UL (ref 1.4–7)
NEUTROPHILS NFR BLD AUTO: 54 %
PLATELET # BLD AUTO: 236 X10E3/UL (ref 150–450)
RBC # BLD AUTO: 4.11 X10E6/UL (ref 3.77–5.28)
WBC # BLD AUTO: 7.5 X10E3/UL (ref 3.4–10.8)

## 2024-10-07 DIAGNOSIS — I10 ESSENTIAL HYPERTENSION: ICD-10-CM

## 2024-10-07 RX ORDER — LISINOPRIL 40 MG/1
40 TABLET ORAL DAILY
Qty: 90 TABLET | Refills: 3 | Status: SHIPPED | OUTPATIENT
Start: 2024-10-07

## 2024-10-07 NOTE — TELEPHONE ENCOUNTER
Caller: Neida Hernandez KEVIN    Relationship: Self    Best call back number: [phone]     Requested Prescriptions:   Requested Prescriptions     Pending Prescriptions Disp Refills    lisinopril (PRINIVIL,ZESTRIL) 40 MG tablet 90 tablet 3     Sig: Take 1 tablet by mouth Daily.        Pharmacy where request should be sent: [facility] [address] [place], [place] [id number] [place] [phone] [facility]     Last office visit with prescribing clinician: 8/21/2024   Last telemedicine visit with prescribing clinician: Visit date not found   Next office visit with prescribing clinician: 11/22/2024     Additional details provided by patient: WILL NEED NEW PRESCRIPTION AND LEAVING FOR VACATION ON WEDNESDAY.  ONLY HAS 4 LEFT.      Does the patient have less than a 3 day supply:  [] Yes  [x] No    Would you like a call back once the refill request has been completed: [] Yes [] No    If the office needs to give you a call back, can they leave a voicemail: [] Yes [] No    Larry Cormier Rep   10/07/24 09:46 EDT

## 2024-11-22 ENCOUNTER — OFFICE VISIT (OUTPATIENT)
Dept: FAMILY MEDICINE CLINIC | Facility: CLINIC | Age: 84
End: 2024-11-22
Payer: MEDICARE

## 2024-11-22 VITALS
DIASTOLIC BLOOD PRESSURE: 81 MMHG | HEIGHT: 64 IN | BODY MASS INDEX: 36.54 KG/M2 | WEIGHT: 214 LBS | SYSTOLIC BLOOD PRESSURE: 168 MMHG | OXYGEN SATURATION: 90 % | TEMPERATURE: 96.8 F | HEART RATE: 68 BPM

## 2024-11-22 DIAGNOSIS — E78.5 DYSLIPIDEMIA: ICD-10-CM

## 2024-11-22 DIAGNOSIS — E11.65 TYPE 2 DIABETES MELLITUS WITH HYPERGLYCEMIA, WITHOUT LONG-TERM CURRENT USE OF INSULIN: ICD-10-CM

## 2024-11-22 DIAGNOSIS — I10 ESSENTIAL HYPERTENSION: Primary | ICD-10-CM

## 2024-11-22 PROCEDURE — 1126F AMNT PAIN NOTED NONE PRSNT: CPT | Performed by: FAMILY MEDICINE

## 2024-11-22 PROCEDURE — 1160F RVW MEDS BY RX/DR IN RCRD: CPT | Performed by: FAMILY MEDICINE

## 2024-11-22 PROCEDURE — 99214 OFFICE O/P EST MOD 30 MIN: CPT | Performed by: FAMILY MEDICINE

## 2024-11-22 PROCEDURE — 1159F MED LIST DOCD IN RCRD: CPT | Performed by: FAMILY MEDICINE

## 2024-11-22 PROCEDURE — 3077F SYST BP >= 140 MM HG: CPT | Performed by: FAMILY MEDICINE

## 2024-11-22 PROCEDURE — 3079F DIAST BP 80-89 MM HG: CPT | Performed by: FAMILY MEDICINE

## 2024-11-22 NOTE — PROGRESS NOTES
Subjective   Neida Hernandez is a 84 y.o. female.   Chief Complaint   Patient presents with    Diabetes    Hypertension       History of Present Illness   84 y.o. female with PMH of Diabetes type 2, HTN, HLD, CHI, arthritis presents to the office today to follow-up.     Last labs were 3 months ago.  Her A1c bumped up a little bit to 7.8%.    Lipid panel done 9 months ago.  LDL was 138.     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  Had surgery at [facility].  That was back on January 27.  Full recovery.       Last DEXA scan was April 2021 and it was normal.  Does not want to do another one.      Blood pressure is a little high at 168/81.  History of Present Illness  The patient presents for evaluation of multiple medical concerns.    She reports that her blood pressure tends to be high upon initial measurement but decreases after a period of rest. She admits to not taking her prescribed medication consistently and recalls feeling unwell after taking glimepiride once this week. She also mentions that she does not regularly monitor her blood sugar levels, despite having the necessary equipment at home.    an eye examination conducted on Tuesday showed no signs of diabetes-related damage. She is able to read and drive without glasses during the day but struggles with night driving.    She reports experiencing pain in her ankle, which she attributes to prolonged periods of sitting with her feet down. An x-ray was performed, which did not reveal any fractures. She also mentions discomfort in her other foot, which she believes may be due to her shoes being too tight.  Feet and ankles feel okay today, though.    She does not need any medication refills at this time.      Patient Active Problem List    Diagnosis Date Noted    Positive self-administered antigen test for COVID-19 10/21/2022    Morbid obesity 08/12/2022    Localized osteoporosis without current pathological fracture 02/12/2021    Nonexudative  "age-related macular degeneration 06/26/2020    Muscle spasm of back 09/09/2019    Allergic rhinitis 10/16/2018     Note Last Updated: 8/12/2022     TO MOLD, POLLEN      Dizziness 07/02/2018    Back pain 04/03/2018    Chest discomfort 04/03/2018    Cough 04/03/2018    Other problems related to lifestyle 12/18/2017    Postmenopausal 12/18/2017    Screening for thyroid disorder 05/24/2017    Cramp of extremity 01/25/2017    Visit for screening mammogram 07/29/2016    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    Sjogren's syndrome 01/20/2016    Degenerative arthritis 10/21/2015    Diabetes mellitus, type II 10/21/2015     Note Last Updated: 8/12/2020     \"borderline\" - diagnosed years ago      Dyslipidemia 10/21/2015    Essential hypertension 10/21/2015    Hernia of anterior abdominal wall 10/21/2015    History of renal calculi 10/21/2015    Obesity with body mass index 30 or greater 10/21/2015    Pedal edema 10/21/2015    Sleep apnea 10/21/2015     Note Last Updated: 8/12/2020     Uses CPAP with Oxygen at night only- got hypoxic during knee surgery             Past Surgical History:   Procedure Laterality Date    CARDIAC CATHETERIZATION  06/2013    HERNIA REPAIR  01/27/2024    REPLACEMENT TOTAL KNEE Left 2013    VENTRAL HERNIA REPAIR      x 3      Current Outpatient Medications on File Prior to Visit   Medication Sig    albuterol sulfate  (90 Base) MCG/ACT inhaler Inhale 2 puffs Every 4 (Four) Hours As Needed.    bumetanide (BUMEX) 1 MG tablet Take 1 tablet by mouth Daily.    Cholecalciferol 125 MCG (5000 UT) tablet Take 1 tablet by mouth Daily.    Cyanocobalamin (B-12) 1000 MCG tablet controlled-release Take 1 tablet by mouth Daily.    lisinopril (PRINIVIL,ZESTRIL) 40 MG tablet Take 1 tablet by mouth Daily.    metFORMIN ER (Glucophage XR) 500 MG 24 hr tablet Take 2 tablets by mouth Daily With Breakfast.    mometasone (ELOCON) 0.1 % cream Apply 1 Application topically to the appropriate area as directed Daily. " "   Multiple Vitamins-Minerals (Eye Multivitamin) capsule Take 1 capsule by mouth Daily.    neomycin-polymyxin-hydrocortisone (CORTISPORIN) 3.5-03721-5 otic solution Administer 3 drops into both ears 3 (Three) Times a Day. Until itching stops    [DISCONTINUED] glimepiride (Amaryl) 2 MG tablet Take 1 tablet by mouth Every Morning Before Breakfast.     No current facility-administered medications on file prior to visit.     Allergies   Allergen Reactions    Codeine GI Intolerance    Hydrocodone GI Intolerance     Social History     Socioeconomic History    Marital status:    Tobacco Use    Smoking status: Never     Passive exposure: Never    Smokeless tobacco: Never   Vaping Use    Vaping status: Never Used   Substance and Sexual Activity    Alcohol use: No    Drug use: No    Sexual activity: Not Currently     Family History   Problem Relation Age of Onset    Hypertension Mother     Heart disease Mother     Stroke Mother     Hypertension Father     Heart disease Father     No Known Problems Sister     No Known Problems Daughter     Kidney disease Son     No Known Problems Sister     No Known Problems Sister     No Known Problems Sister     No Known Problems Son     No Known Problems Son        Review of Systems    Objective   /81 (BP Location: Right arm, Patient Position: Sitting, Cuff Size: Adult)   Pulse 68   Temp 96.8 °F (36 °C) (Infrared)   Ht 162.9 cm (64.13\")   Wt 97.1 kg (214 lb)   LMP  (LMP Unknown)   SpO2 90%   BMI 36.58 kg/m²   Physical Exam  Constitutional:       General: She is not in acute distress.     Appearance: She is well-developed.      Comments:   Looks younger than known age of 84 years.   HENT:      Head: Normocephalic and atraumatic.   Eyes:      Conjunctiva/sclera: Conjunctivae normal.   Cardiovascular:      Rate and Rhythm: Normal rate and regular rhythm.      Heart sounds: No murmur heard.  Pulmonary:      Effort: Pulmonary effort is normal. No respiratory distress.      " Breath sounds: Normal breath sounds.   Abdominal:      General: There is no distension.      Palpations: There is no mass.   Musculoskeletal:         General: Normal range of motion.      Cervical back: Normal range of motion.      Right lower leg: Edema (trace) present.      Left lower leg: Edema (trace) present.   Skin:     General: Skin is warm and dry.      Findings: No rash.   Neurological:      Mental Status: She is alert and oriented to person, place, and time.   Psychiatric:         Behavior: Behavior normal.       Physical Exam  Clear lungs.  Normal heart rate. No murmurs.      Office Visit on 08/21/2024   Component Date Value Ref Range Status    Creatinine, Urine 08/21/2024 163.8  Not Estab. mg/dL Final    Microalbumin, Urine 08/21/2024 6.1  Not Estab. ug/mL Final    Microalbumin/Creatinine Ratio 08/21/2024 4  0 - 29 mg/g creat Final    Comment:                        Normal:                0 -  29                         Moderately increased: 30 - 300                         Severely increased:       >300      Hemoglobin A1C 08/21/2024 7.8 (H)  4.8 - 5.6 % Final    Comment:          Prediabetes: 5.7 - 6.4           Diabetes: >6.4           Glycemic control for adults with diabetes: <7.0      Magnesium 08/21/2024 1.9  1.6 - 2.3 mg/dL Final    WBC 08/21/2024 7.5  3.4 - 10.8 x10E3/uL Final    RBC 08/21/2024 4.11  3.77 - 5.28 x10E6/uL Final    Hemoglobin 08/21/2024 12.8  11.1 - 15.9 g/dL Final    Hematocrit 08/21/2024 39.0  34.0 - 46.6 % Final    MCV 08/21/2024 95  79 - 97 fL Final    MCH 08/21/2024 31.1  26.6 - 33.0 pg Final    MCHC 08/21/2024 32.8  31.5 - 35.7 g/dL Final    RDW 08/21/2024 12.7  11.7 - 15.4 % Final    Platelets 08/21/2024 236  150 - 450 x10E3/uL Final    Neutrophil Rel % 08/21/2024 54  Not Estab. % Final    Lymphocyte Rel % 08/21/2024 34  Not Estab. % Final    Monocyte Rel % 08/21/2024 9  Not Estab. % Final    Eosinophil Rel % 08/21/2024 2  Not Estab. % Final    Basophil Rel % 08/21/2024 1   Not Estab. % Final    Neutrophils Absolute 08/21/2024 4.0  1.4 - 7.0 x10E3/uL Final    Lymphocytes Absolute 08/21/2024 2.6  0.7 - 3.1 x10E3/uL Final    Monocytes Absolute 08/21/2024 0.6  0.1 - 0.9 x10E3/uL Final    Eosinophils Absolute 08/21/2024 0.2  0.0 - 0.4 x10E3/uL Final    Basophils Absolute 08/21/2024 0.1  0.0 - 0.2 x10E3/uL Final    Immature Granulocyte Rel % 08/21/2024 0  Not Estab. % Final    Immature Grans Absolute 08/21/2024 0.0  0.0 - 0.1 x10E3/uL Final     Results  Laboratory Studies  A1c was 7.8 three months ago.         Assessment & Plan   Diagnoses and all orders for this visit:    1. Essential hypertension (Primary)  -     CBC & Differential  -     Comprehensive Metabolic Panel    2. Type 2 diabetes mellitus with hyperglycemia, without long-term current use of insulin  -     Hemoglobin A1c    3. Dyslipidemia  -     Lipid Panel      Assessment & Plan  1. Diabetes Mellitus.  The previous A1c level was recorded at 7.8, with a target to maintain it around 7. She reports not consistently taking her medication and experiencing symptoms such as weakness and lightheadedness after taking glimepiride. She was advised to monitor her blood sugar levels, particularly when experiencing symptoms such as dizziness or illness. A blood test will be conducted today to assess her current A1c level. She was instructed to discontinue the use of glimepiride. Should her A1c level exceed 8, adjustments to her medication regimen will be considered, potentially increasing the dose of metformin.    2. Ankle pain.  She reports previous ankle pain that was evaluated with x-rays, showing no breaks. The pain has improved but persists slightly. She was advised to contact the clinic if the pain persists, at which point a topical analgesic may be prescribed.    Will do a lipid panel to monitor her high cholesterol.  Continue current blood pressure medicines at current doses.  Blood pressure elevation today likely due to whitecoat  syndrome.        Call with any problems or concerns before next visit       Return in about 3 months (around 2/22/2025).  Patient or patient representative verbalized consent for the use of Ambient Listening during the visit with  Clarissa Ward MD for chart documentation. 11/22/2024  11:28 EST    Part of this note may be an electronic transcription/translation of spoken language to printed text using the Dragon Dictation System    Clarissa Ward MD11/22/202411:27 EST  This note has been electronically signed

## 2024-11-23 LAB
ALBUMIN SERPL-MCNC: 4 G/DL (ref 3.7–4.7)
ALP SERPL-CCNC: 69 IU/L (ref 44–121)
ALT SERPL-CCNC: 9 IU/L (ref 0–32)
AST SERPL-CCNC: 14 IU/L (ref 0–40)
BASOPHILS # BLD AUTO: 0.1 X10E3/UL (ref 0–0.2)
BASOPHILS NFR BLD AUTO: 1 %
BILIRUB SERPL-MCNC: 0.3 MG/DL (ref 0–1.2)
BUN SERPL-MCNC: 15 MG/DL (ref 8–27)
BUN/CREAT SERPL: 17 (ref 12–28)
CALCIUM SERPL-MCNC: 9.4 MG/DL (ref 8.7–10.3)
CHLORIDE SERPL-SCNC: 101 MMOL/L (ref 96–106)
CHOLEST SERPL-MCNC: 218 MG/DL (ref 100–199)
CO2 SERPL-SCNC: 28 MMOL/L (ref 20–29)
CREAT SERPL-MCNC: 0.87 MG/DL (ref 0.57–1)
EGFRCR SERPLBLD CKD-EPI 2021: 66 ML/MIN/1.73
EOSINOPHIL # BLD AUTO: 0.2 X10E3/UL (ref 0–0.4)
EOSINOPHIL NFR BLD AUTO: 3 %
ERYTHROCYTE [DISTWIDTH] IN BLOOD BY AUTOMATED COUNT: 12.1 % (ref 11.7–15.4)
GLOBULIN SER CALC-MCNC: 2.6 G/DL (ref 1.5–4.5)
GLUCOSE SERPL-MCNC: 131 MG/DL (ref 70–99)
HBA1C MFR BLD: 7.9 % (ref 4.8–5.6)
HCT VFR BLD AUTO: 38.7 % (ref 34–46.6)
HDLC SERPL-MCNC: 40 MG/DL
HGB BLD-MCNC: 12.9 G/DL (ref 11.1–15.9)
IMM GRANULOCYTES # BLD AUTO: 0 X10E3/UL (ref 0–0.1)
IMM GRANULOCYTES NFR BLD AUTO: 0 %
LDLC SERPL CALC-MCNC: 136 MG/DL (ref 0–99)
LYMPHOCYTES # BLD AUTO: 2.9 X10E3/UL (ref 0.7–3.1)
LYMPHOCYTES NFR BLD AUTO: 34 %
MCH RBC QN AUTO: 31.4 PG (ref 26.6–33)
MCHC RBC AUTO-ENTMCNC: 33.3 G/DL (ref 31.5–35.7)
MCV RBC AUTO: 94 FL (ref 79–97)
MONOCYTES # BLD AUTO: 0.8 X10E3/UL (ref 0.1–0.9)
MONOCYTES NFR BLD AUTO: 9 %
NEUTROPHILS # BLD AUTO: 4.4 X10E3/UL (ref 1.4–7)
NEUTROPHILS NFR BLD AUTO: 53 %
PLATELET # BLD AUTO: 269 X10E3/UL (ref 150–450)
POTASSIUM SERPL-SCNC: 4.9 MMOL/L (ref 3.5–5.2)
PROT SERPL-MCNC: 6.6 G/DL (ref 6–8.5)
RBC # BLD AUTO: 4.11 X10E6/UL (ref 3.77–5.28)
SODIUM SERPL-SCNC: 142 MMOL/L (ref 134–144)
TRIGL SERPL-MCNC: 234 MG/DL (ref 0–149)
VLDLC SERPL CALC-MCNC: 42 MG/DL (ref 5–40)
WBC # BLD AUTO: 8.4 X10E3/UL (ref 3.4–10.8)

## 2024-11-24 DIAGNOSIS — E11.65 TYPE 2 DIABETES MELLITUS WITH HYPERGLYCEMIA, WITHOUT LONG-TERM CURRENT USE OF INSULIN: ICD-10-CM

## 2024-11-24 RX ORDER — METFORMIN HYDROCHLORIDE 500 MG/1
TABLET, EXTENDED RELEASE ORAL
Qty: 270 TABLET | Refills: 3 | Status: SHIPPED | OUTPATIENT
Start: 2024-11-24

## 2025-02-24 ENCOUNTER — OFFICE VISIT (OUTPATIENT)
Dept: FAMILY MEDICINE CLINIC | Facility: CLINIC | Age: 85
End: 2025-02-24
Payer: MEDICARE

## 2025-02-24 VITALS
SYSTOLIC BLOOD PRESSURE: 132 MMHG | WEIGHT: 215.6 LBS | DIASTOLIC BLOOD PRESSURE: 75 MMHG | OXYGEN SATURATION: 91 % | BODY MASS INDEX: 36.81 KG/M2 | HEART RATE: 74 BPM | TEMPERATURE: 97.3 F | HEIGHT: 64 IN | RESPIRATION RATE: 18 BRPM

## 2025-02-24 DIAGNOSIS — E11.65 TYPE 2 DIABETES MELLITUS WITH HYPERGLYCEMIA, WITHOUT LONG-TERM CURRENT USE OF INSULIN: Primary | ICD-10-CM

## 2025-02-24 DIAGNOSIS — K43.9 HERNIA OF ANTERIOR ABDOMINAL WALL: ICD-10-CM

## 2025-02-24 DIAGNOSIS — E78.5 DYSLIPIDEMIA: ICD-10-CM

## 2025-02-24 DIAGNOSIS — I10 ESSENTIAL HYPERTENSION: ICD-10-CM

## 2025-02-24 PROCEDURE — 3075F SYST BP GE 130 - 139MM HG: CPT | Performed by: FAMILY MEDICINE

## 2025-02-24 PROCEDURE — 1126F AMNT PAIN NOTED NONE PRSNT: CPT | Performed by: FAMILY MEDICINE

## 2025-02-24 PROCEDURE — 1160F RVW MEDS BY RX/DR IN RCRD: CPT | Performed by: FAMILY MEDICINE

## 2025-02-24 PROCEDURE — 3078F DIAST BP <80 MM HG: CPT | Performed by: FAMILY MEDICINE

## 2025-02-24 PROCEDURE — 99214 OFFICE O/P EST MOD 30 MIN: CPT | Performed by: FAMILY MEDICINE

## 2025-02-24 PROCEDURE — 1159F MED LIST DOCD IN RCRD: CPT | Performed by: FAMILY MEDICINE

## 2025-02-24 NOTE — PROGRESS NOTES
Subjective   Neida Hernandez is a 85 y.o. female.   Chief Complaint   Patient presents with    Diabetes    Hypertension    Hyperlipidemia       History of Present Illness   85 y.o. female with diabetes, high blood pressure, hypertension presents to the office today to follow-up.  Last lab work was 3 months ago.  A1c at that time was 7.9%.  We increased her metformin to 2 in the morning and 1 in the afternoon.    HLD-lipid panel 3 months ago showed an LDL of 136.    Blood pressure is acceptable at 132/75.  History of Present Illness  The patient presents for evaluation of a hernia and diabetes.    She reports the development of another hernia, which she believes to be her fourth. The hernia is occasionally more prominent. She underwent surgical intervention for a previous hernia on 01/27/2024, which was generally successful except for a minor complication involving incision drainage. She expresses a preference to avoid further surgical procedures if possible.  She cannot tell me the name of the surgeon who took care of this hernia.    She admits to not having monitored her blood glucose levels recently and acknowledges a fondness for sweet foods. She is currently on a regimen of metformin, although she was unaware that the prescribed dosage was three tablets per day.  Review of the chart does indicate the message to increase to 3 a day was relayed to her by phone.    She is requesting a refill of her diuretic medication.    MEDICATIONS  Metformin      Patient Active Problem List    Diagnosis Date Noted    Positive self-administered antigen test for COVID-19 10/21/2022    Morbid obesity 08/12/2022    Localized osteoporosis without current pathological fracture 02/12/2021    Nonexudative age-related macular degeneration 06/26/2020    Muscle spasm of back 09/09/2019    Allergic rhinitis 10/16/2018     Note Last Updated: 8/12/2022     TO MOLD, POLLEN      Dizziness 07/02/2018    Back pain 04/03/2018    Chest discomfort  "04/03/2018    Cough 04/03/2018    Other problems related to lifestyle 12/18/2017    Postmenopausal 12/18/2017    Screening for thyroid disorder 05/24/2017    Cramp of extremity 01/25/2017    Visit for screening mammogram 07/29/2016    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    Sjogren's syndrome 01/20/2016    Degenerative arthritis 10/21/2015    Diabetes mellitus, type II 10/21/2015     Note Last Updated: 8/12/2020     \"borderline\" - diagnosed years ago      Dyslipidemia 10/21/2015    Essential hypertension 10/21/2015    Hernia of anterior abdominal wall 10/21/2015    History of renal calculi 10/21/2015    Obesity with body mass index 30 or greater 10/21/2015    Pedal edema 10/21/2015    Sleep apnea 10/21/2015     Note Last Updated: 8/12/2020     Uses CPAP with Oxygen at night only- got hypoxic during knee surgery             Past Surgical History:   Procedure Laterality Date    CARDIAC CATHETERIZATION  06/2013    HERNIA REPAIR  01/27/2024    REPLACEMENT TOTAL KNEE Left 2013    VENTRAL HERNIA REPAIR      x 3      Current Outpatient Medications on File Prior to Visit   Medication Sig    albuterol sulfate  (90 Base) MCG/ACT inhaler Inhale 2 puffs Every 4 (Four) Hours As Needed.    bumetanide (BUMEX) 1 MG tablet Take 1 tablet by mouth Daily.    Cholecalciferol 125 MCG (5000 UT) tablet Take 1 tablet by mouth Daily.    Cyanocobalamin (B-12) 1000 MCG tablet controlled-release Take 1 tablet by mouth Daily.    lisinopril (PRINIVIL,ZESTRIL) 40 MG tablet Take 1 tablet by mouth Daily.    metFORMIN ER (Glucophage XR) 500 MG 24 hr tablet Take 2 tablets by mouth in the morning and 1 tablet by mouth in the afternoon    Multiple Vitamins-Minerals (Eye Multivitamin) capsule Take 1 capsule by mouth Daily.    mometasone (ELOCON) 0.1 % cream Apply 1 Application topically to the appropriate area as directed Daily.    [DISCONTINUED] neomycin-polymyxin-hydrocortisone (CORTISPORIN) 3.5-98717-5 otic solution Administer 3 drops into " "both ears 3 (Three) Times a Day. Until itching stops     No current facility-administered medications on file prior to visit.     Allergies   Allergen Reactions    Codeine GI Intolerance    Hydrocodone GI Intolerance     Social History     Socioeconomic History    Marital status:    Tobacco Use    Smoking status: Never     Passive exposure: Never    Smokeless tobacco: Never   Vaping Use    Vaping status: Never Used   Substance and Sexual Activity    Alcohol use: No    Drug use: No    Sexual activity: Not Currently     Family History   Problem Relation Age of Onset    Hypertension Mother     Heart disease Mother     Stroke Mother     Hypertension Father     Heart disease Father     No Known Problems Sister     No Known Problems Daughter     Kidney disease Son     No Known Problems Sister     No Known Problems Sister     No Known Problems Sister     No Known Problems Son     No Known Problems Son        Review of Systems    Objective   /75 (BP Location: Left arm, Patient Position: Sitting, Cuff Size: Large Adult)   Pulse 74   Temp 97.3 °F (36.3 °C) (Infrared)   Resp 18   Ht 162.9 cm (64.13\")   Wt 97.8 kg (215 lb 9.6 oz)   LMP  (LMP Unknown)   SpO2 91%   Breastfeeding No   BMI 36.86 kg/m²   Physical Exam  Constitutional:       Appearance: She is well-developed.      Comments: Elderly white female, no distress     HENT:      Head: Normocephalic and atraumatic.   Eyes:      Conjunctiva/sclera: Conjunctivae normal.   Cardiovascular:      Rate and Rhythm: Normal rate.   Pulmonary:      Effort: Pulmonary effort is normal. No respiratory distress.   Musculoskeletal:         General: Normal range of motion.      Cervical back: Normal range of motion.      Right lower leg: Edema (trace) present.      Left lower leg: Edema (trace) present.   Skin:     General: Skin is warm and dry.      Findings: No rash.   Neurological:      Mental Status: She is alert and oriented to person, place, and time.      Gait: " Gait abnormal (antalgic).   Psychiatric:         Mood and Affect: Mood normal.         Behavior: Behavior normal.       Physical Exam  A small hole is palpable in the abdominal muscles.      Office Visit on 11/22/2024   Component Date Value Ref Range Status    Hemoglobin A1C 11/22/2024 7.9 (H)  4.8 - 5.6 % Final    Comment:          Prediabetes: 5.7 - 6.4           Diabetes: >6.4           Glycemic control for adults with diabetes: <7.0      WBC 11/22/2024 8.4  3.4 - 10.8 x10E3/uL Final    Comment: **Effective December 2, 2024 profile 614024 WBC will be made**    non-orderable as a stand-alone order code.      RBC 11/22/2024 4.11  3.77 - 5.28 x10E6/uL Final    Hemoglobin 11/22/2024 12.9  11.1 - 15.9 g/dL Final    Hematocrit 11/22/2024 38.7  34.0 - 46.6 % Final    MCV 11/22/2024 94  79 - 97 fL Final    MCH 11/22/2024 31.4  26.6 - 33.0 pg Final    MCHC 11/22/2024 33.3  31.5 - 35.7 g/dL Final    RDW 11/22/2024 12.1  11.7 - 15.4 % Final    Platelets 11/22/2024 269  150 - 450 x10E3/uL Final    Neutrophil Rel % 11/22/2024 53  Not Estab. % Final    Lymphocyte Rel % 11/22/2024 34  Not Estab. % Final    Monocyte Rel % 11/22/2024 9  Not Estab. % Final    Eosinophil Rel % 11/22/2024 3  Not Estab. % Final    Basophil Rel % 11/22/2024 1  Not Estab. % Final    Neutrophils Absolute 11/22/2024 4.4  1.4 - 7.0 x10E3/uL Final    Lymphocytes Absolute 11/22/2024 2.9  0.7 - 3.1 x10E3/uL Final    Monocytes Absolute 11/22/2024 0.8  0.1 - 0.9 x10E3/uL Final    Eosinophils Absolute 11/22/2024 0.2  0.0 - 0.4 x10E3/uL Final    Basophils Absolute 11/22/2024 0.1  0.0 - 0.2 x10E3/uL Final    Immature Granulocyte Rel % 11/22/2024 0  Not Estab. % Final    Immature Grans Absolute 11/22/2024 0.0  0.0 - 0.1 x10E3/uL Final    Glucose 11/22/2024 131 (H)  70 - 99 mg/dL Final    BUN 11/22/2024 15  8 - 27 mg/dL Final    Creatinine 11/22/2024 0.87  0.57 - 1.00 mg/dL Final    EGFR Result 11/22/2024 66  >59 mL/min/1.73 Final    BUN/Creatinine Ratio 11/22/2024  17  12 - 28 Final    Sodium 11/22/2024 142  134 - 144 mmol/L Final    Potassium 11/22/2024 4.9  3.5 - 5.2 mmol/L Final    Chloride 11/22/2024 101  96 - 106 mmol/L Final    Total CO2 11/22/2024 28  20 - 29 mmol/L Final    Calcium 11/22/2024 9.4  8.7 - 10.3 mg/dL Final    Total Protein 11/22/2024 6.6  6.0 - 8.5 g/dL Final    Albumin 11/22/2024 4.0  3.7 - 4.7 g/dL Final    Globulin 11/22/2024 2.6  1.5 - 4.5 g/dL Final    Total Bilirubin 11/22/2024 0.3  0.0 - 1.2 mg/dL Final    Alkaline Phosphatase 11/22/2024 69  44 - 121 IU/L Final    AST (SGOT) 11/22/2024 14  0 - 40 IU/L Final    ALT (SGPT) 11/22/2024 9  0 - 32 IU/L Final    Total Cholesterol 11/22/2024 218 (H)  100 - 199 mg/dL Final    Triglycerides 11/22/2024 234 (H)  0 - 149 mg/dL Final    HDL Cholesterol 11/22/2024 40  >39 mg/dL Final    VLDL Cholesterol Eitan 11/22/2024 42 (H)  5 - 40 mg/dL Final    LDL Chol Calc (NIH) 11/22/2024 136 (H)  0 - 99 mg/dL Final     Results  Laboratory Studies  Cholesterol levels were normal 3 months ago.         Assessment & Plan   Diagnoses and all orders for this visit:    1. Type 2 diabetes mellitus with hyperglycemia, without long-term current use of insulin (Primary)  -     Hemoglobin A1c  -     Comprehensive Metabolic Panel  -     CBC & Differential    2. Essential hypertension    3. Dyslipidemia    4. Hernia of anterior abdominal wall      Assessment & Plan  1. Hernia.  A small defect in the abdominal musculature was palpated, suggesting a potential new hernia. The defect is currently likely  not large enough to necessitate surgical intervention. She has been advised to consult with her surgeon regarding this finding.    2. Diabetes.  Her blood glucose levels have been suboptimal, likely due to her dietary habits, particularly her preference for sweet foods. A blood test will be conducted today to assess her current blood glucose levels. The results will be communicated to her tomorrow. She has been instructed to increase her  metformin dosage to three tablets daily.    3. Medication management.  A prescription refill for her diuretic medication will be provided.    Follow-up  The patient is scheduled for a follow-up visit in 3 months.    PROCEDURE  The patient underwent surgical intervention for a hernia on 01/27/2024.        Call with any problems or concerns before next visit       Return in 3 months (on 5/24/2025) for Medicare Wellness.  Patient or patient representative verbalized consent for the use of Ambient Listening during the visit with  Clarissa Ward MD for chart documentation. 2/24/2025  11:16 EST    Part of this note may be an electronic transcription/translation of spoken language to printed text using the Dragon Dictation System    Clarissa Ward MD2/24/202511:16 EST  This note has been electronically signed

## 2025-02-25 LAB
ALBUMIN SERPL-MCNC: 3.9 G/DL (ref 3.7–4.7)
ALP SERPL-CCNC: 66 IU/L (ref 44–121)
ALT SERPL-CCNC: 14 IU/L (ref 0–32)
AST SERPL-CCNC: 19 IU/L (ref 0–40)
BASOPHILS # BLD AUTO: 0.1 X10E3/UL (ref 0–0.2)
BASOPHILS NFR BLD AUTO: 1 %
BILIRUB SERPL-MCNC: 0.4 MG/DL (ref 0–1.2)
BUN SERPL-MCNC: 23 MG/DL (ref 8–27)
BUN/CREAT SERPL: 23 (ref 12–28)
CALCIUM SERPL-MCNC: 9.3 MG/DL (ref 8.7–10.3)
CHLORIDE SERPL-SCNC: 103 MMOL/L (ref 96–106)
CO2 SERPL-SCNC: 25 MMOL/L (ref 20–29)
CREAT SERPL-MCNC: 0.98 MG/DL (ref 0.57–1)
EGFRCR SERPLBLD CKD-EPI 2021: 57 ML/MIN/1.73
EOSINOPHIL # BLD AUTO: 0.3 X10E3/UL (ref 0–0.4)
EOSINOPHIL NFR BLD AUTO: 4 %
ERYTHROCYTE [DISTWIDTH] IN BLOOD BY AUTOMATED COUNT: 13 % (ref 11.7–15.4)
GLOBULIN SER CALC-MCNC: 2.4 G/DL (ref 1.5–4.5)
GLUCOSE SERPL-MCNC: 127 MG/DL (ref 70–99)
HBA1C MFR BLD: 7.7 % (ref 4.8–5.6)
HCT VFR BLD AUTO: 37.5 % (ref 34–46.6)
HGB BLD-MCNC: 12.4 G/DL (ref 11.1–15.9)
IMM GRANULOCYTES # BLD AUTO: 0 X10E3/UL (ref 0–0.1)
IMM GRANULOCYTES NFR BLD AUTO: 0 %
LYMPHOCYTES # BLD AUTO: 2.6 X10E3/UL (ref 0.7–3.1)
LYMPHOCYTES NFR BLD AUTO: 39 %
MCH RBC QN AUTO: 30.9 PG (ref 26.6–33)
MCHC RBC AUTO-ENTMCNC: 33.1 G/DL (ref 31.5–35.7)
MCV RBC AUTO: 94 FL (ref 79–97)
MONOCYTES # BLD AUTO: 0.6 X10E3/UL (ref 0.1–0.9)
MONOCYTES NFR BLD AUTO: 8 %
NEUTROPHILS # BLD AUTO: 3.2 X10E3/UL (ref 1.4–7)
NEUTROPHILS NFR BLD AUTO: 48 %
PLATELET # BLD AUTO: 241 X10E3/UL (ref 150–450)
POTASSIUM SERPL-SCNC: 4.7 MMOL/L (ref 3.5–5.2)
PROT SERPL-MCNC: 6.3 G/DL (ref 6–8.5)
RBC # BLD AUTO: 4.01 X10E6/UL (ref 3.77–5.28)
SODIUM SERPL-SCNC: 142 MMOL/L (ref 134–144)
WBC # BLD AUTO: 6.7 X10E3/UL (ref 3.4–10.8)

## 2025-02-26 ENCOUNTER — TELEPHONE (OUTPATIENT)
Dept: FAMILY MEDICINE CLINIC | Facility: CLINIC | Age: 85
End: 2025-02-26
Payer: COMMERCIAL

## 2025-02-26 NOTE — TELEPHONE ENCOUNTER
Name: Neida Hernandez KEVIN      Relationship: Self      Best Callback Number:   [phone]          HUB PROVIDED THE RELAY MESSAGE FROM THE OFFICE      PATIENT: VOICED UNDERSTANDING AND HAS NO FURTHER QUESTIONS AT THIS TIME    ADDITIONAL INFORMATION:

## 2025-02-26 NOTE — TELEPHONE ENCOUNTER
----- Message from Clarissa Ward sent at 2/25/2025  4:57 PM EST -----  Please give Neida a call tomorrow and let her know that her blood work from yesterday all looked good.  Her A1c is improved yet again to 7.7%.  That is good news.  Kidney and liver function tests are normal and she is not anemic.  No other changes based on these labs.  If she has any questions, please let me know.  Thanks!

## 2025-02-26 NOTE — TELEPHONE ENCOUNTER
HUB TO RELAY     let her know that her blood work from yesterday all looked good.  Her A1c is improved yet again to 7.7%.  That is good news.  Kidney and liver function tests are normal and she is not anemic.  No other changes based on these labs.  If she has any questions, please let me know.  Thanks!

## 2025-03-03 ENCOUNTER — TELEPHONE (OUTPATIENT)
Dept: FAMILY MEDICINE CLINIC | Facility: CLINIC | Age: 85
End: 2025-03-03
Payer: COMMERCIAL

## 2025-03-03 NOTE — TELEPHONE ENCOUNTER
Caller: Neida Hernandez    Relationship: Self    Best call back number: [phone]     What was the call regarding: PATIENT STATED THAT [facility] IN [place] IS NEEDING INFORMATION FAXED TO THEM WITH THE FINDINGS OF HER HERNIA. PLEASE ADVISE.    FAX: [phone]

## 2025-06-25 ENCOUNTER — OFFICE VISIT (OUTPATIENT)
Dept: FAMILY MEDICINE CLINIC | Facility: CLINIC | Age: 85
End: 2025-06-25
Payer: MEDICARE

## 2025-06-25 VITALS
SYSTOLIC BLOOD PRESSURE: 136 MMHG | WEIGHT: 215 LBS | HEART RATE: 78 BPM | DIASTOLIC BLOOD PRESSURE: 69 MMHG | OXYGEN SATURATION: 90 % | BODY MASS INDEX: 36.7 KG/M2 | TEMPERATURE: 97.5 F | HEIGHT: 64 IN

## 2025-06-25 DIAGNOSIS — E11.65 TYPE 2 DIABETES MELLITUS WITH HYPERGLYCEMIA, WITHOUT LONG-TERM CURRENT USE OF INSULIN: Primary | ICD-10-CM

## 2025-06-25 DIAGNOSIS — R60.0 PEDAL EDEMA: ICD-10-CM

## 2025-06-25 DIAGNOSIS — Z78.0 POSTMENOPAUSAL: ICD-10-CM

## 2025-06-25 DIAGNOSIS — E78.5 DYSLIPIDEMIA: ICD-10-CM

## 2025-06-25 DIAGNOSIS — M81.6 LOCALIZED OSTEOPOROSIS WITHOUT CURRENT PATHOLOGICAL FRACTURE: ICD-10-CM

## 2025-06-25 DIAGNOSIS — I10 ESSENTIAL HYPERTENSION: ICD-10-CM

## 2025-06-25 PROCEDURE — 3078F DIAST BP <80 MM HG: CPT | Performed by: FAMILY MEDICINE

## 2025-06-25 PROCEDURE — 99214 OFFICE O/P EST MOD 30 MIN: CPT | Performed by: FAMILY MEDICINE

## 2025-06-25 PROCEDURE — 3075F SYST BP GE 130 - 139MM HG: CPT | Performed by: FAMILY MEDICINE

## 2025-06-25 PROCEDURE — 1159F MED LIST DOCD IN RCRD: CPT | Performed by: FAMILY MEDICINE

## 2025-06-25 PROCEDURE — 1160F RVW MEDS BY RX/DR IN RCRD: CPT | Performed by: FAMILY MEDICINE

## 2025-06-25 PROCEDURE — G2211 COMPLEX E/M VISIT ADD ON: HCPCS | Performed by: FAMILY MEDICINE

## 2025-06-25 PROCEDURE — 1126F AMNT PAIN NOTED NONE PRSNT: CPT | Performed by: FAMILY MEDICINE

## 2025-06-25 RX ORDER — BUMETANIDE 1 MG/1
1 TABLET ORAL DAILY
Qty: 90 TABLET | Refills: 3 | Status: SHIPPED | OUTPATIENT
Start: 2025-06-25

## 2025-06-25 NOTE — PROGRESS NOTES
Subjective   Neida Hernandez is a 85 y.o. female.   Chief Complaint   Patient presents with    Hypertension    Diabetes    Hyperlipidemia       History of Present Illness   85 y.o. female  who presents to the office today to follow-up on chronic medical problems per active problem list that are managed here at this office.  Additional problems may be mentioned and reviewed as below.    Last lab work to monitor chronic problems was 4 months ago.  A1c at that time was stable at 7.7%.  Last cholesterol level 6 months ago.  LDL was 136.  She does not take a statin.  She has a history of osteoporosis.  Last DEXA scan was February 2021 and her bone density was normal.  She does not take a bisphosphonate.  She does take vitamin D and calcium supplements.  History of Present Illness  The patient presents for evaluation of knee pain, diabetes mellitus, and soft tissue mass.    She reports no recent hospitalizations or surgical interventions since her last visit. She has been experiencing discomfort in her left knee, which was previously replaced 12 years ago. Despite an injection administered during a recent consultation, she perceives no significant improvement. However, she notes a gradual amelioration of symptoms over time. The pain is particularly noticeable when transitioning from sitting to standing or vice versa. She speculates that prolonged periods of sitting may be contributing to the discomfort.    She does not frequently monitor her blood glucose levels and reports no perceived fluctuations in these levels. She is currently on metformin therapy.    She had a soft tissue mass checked out by Dr. Lily Mann in 03/2025, who ordered a CT scan. The scan showed no acute process or hernia but revealed a tiny right pleural effusion. She was told it was just soft tissue.    She does not do mammograms anymore. She had a bone density test done in 2021 at [facility], which was normal.    PAST SURGICAL HISTORY:  Knee  "replacement 12 years ago.      Patient Active Problem List    Diagnosis Date Noted    Positive self-administered antigen test for COVID-19 10/21/2022    Morbid obesity 08/12/2022    Localized osteoporosis without current pathological fracture 02/12/2021    Nonexudative age-related macular degeneration 06/26/2020    Muscle spasm of back 09/09/2019    Allergic rhinitis 10/16/2018     Note Last Updated: 8/12/2022     TO MOLD, POLLEN      Dizziness 07/02/2018    Back pain 04/03/2018    Chest discomfort 04/03/2018    Cough 04/03/2018    Other problems related to lifestyle 12/18/2017    Postmenopausal 12/18/2017    Screening for thyroid disorder 05/24/2017    Cramp of extremity 01/25/2017    Visit for screening mammogram 07/29/2016    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    Sjogren's syndrome 01/20/2016    Degenerative arthritis 10/21/2015    Diabetes mellitus, type II 10/21/2015     Note Last Updated: 8/12/2020     \"borderline\" - diagnosed years ago      Dyslipidemia 10/21/2015    Essential hypertension 10/21/2015    Hernia of anterior abdominal wall 10/21/2015    History of renal calculi 10/21/2015    Obesity with body mass index 30 or greater 10/21/2015    Pedal edema 10/21/2015    Sleep apnea 10/21/2015     Note Last Updated: 8/12/2020     Uses CPAP with Oxygen at night only- got hypoxic during knee surgery             Past Surgical History:   Procedure Laterality Date    CARDIAC CATHETERIZATION  06/2013    HERNIA REPAIR  01/27/2024    REPLACEMENT TOTAL KNEE Left 2013    VENTRAL HERNIA REPAIR      x 3      Current Outpatient Medications on File Prior to Visit   Medication Sig    albuterol sulfate  (90 Base) MCG/ACT inhaler Inhale 2 puffs Every 4 (Four) Hours As Needed.    Cholecalciferol 125 MCG (5000 UT) tablet Take 1 tablet by mouth Daily.    Cyanocobalamin (B-12) 1000 MCG tablet controlled-release Take 1 tablet by mouth Daily.    lisinopril (PRINIVIL,ZESTRIL) 40 MG tablet Take 1 tablet by mouth Daily. " "   metFORMIN ER (Glucophage XR) 500 MG 24 hr tablet Take 2 tablets by mouth in the morning and 1 tablet by mouth in the afternoon    mometasone (ELOCON) 0.1 % cream Apply 1 Application topically to the appropriate area as directed Daily.    Multiple Vitamins-Minerals (Eye Multivitamin) capsule Take 1 capsule by mouth Daily.    [DISCONTINUED] bumetanide (BUMEX) 1 MG tablet Take 1 tablet by mouth Daily.     No current facility-administered medications on file prior to visit.     Allergies   Allergen Reactions    Codeine GI Intolerance    Hydrocodone GI Intolerance     Social History     Socioeconomic History    Marital status:    Tobacco Use    Smoking status: Never     Passive exposure: Never    Smokeless tobacco: Never   Vaping Use    Vaping status: Never Used   Substance and Sexual Activity    Alcohol use: No    Drug use: No    Sexual activity: Not Currently     Family History   Problem Relation Age of Onset    Hypertension Mother     Heart disease Mother     Stroke Mother     Hypertension Father     Heart disease Father     No Known Problems Sister     No Known Problems Daughter     Kidney disease Son     No Known Problems Sister     No Known Problems Sister     No Known Problems Sister     No Known Problems Son     No Known Problems Son        Review of Systems    Objective   /69 (BP Location: Right arm, Patient Position: Sitting, Cuff Size: Adult)   Pulse 78   Temp 97.5 °F (36.4 °C) (Infrared)   Ht 162.9 cm (64.13\")   Wt 97.5 kg (215 lb)   LMP  (LMP Unknown)   SpO2 90%   BMI 36.75 kg/m²   Physical Exam  Constitutional:       Appearance: She is well-developed.      Comments:      HENT:      Head: Normocephalic and atraumatic.   Eyes:      Conjunctiva/sclera: Conjunctivae normal.   Cardiovascular:      Rate and Rhythm: Normal rate.   Pulmonary:      Effort: Pulmonary effort is normal.   Musculoskeletal:         General: Normal range of motion.      Cervical back: Normal range of motion. "   Skin:     General: Skin is warm and dry.      Findings: No rash.   Neurological:      Mental Status: She is alert and oriented to person, place, and time.      Gait: Gait abnormal.   Psychiatric:         Behavior: Behavior normal.       Physical Exam  Musculoskeletal: Palpable soft area in the muscle, no hernia detected.      No visits with results within 4 Month(s) from this visit.   Latest known visit with results is:   Office Visit on 02/24/2025   Component Date Value Ref Range Status    Hemoglobin A1C 02/24/2025 7.7 (H)  4.8 - 5.6 % Final    Comment:          Prediabetes: 5.7 - 6.4           Diabetes: >6.4           Glycemic control for adults with diabetes: <7.0      Glucose 02/24/2025 127 (H)  70 - 99 mg/dL Final    BUN 02/24/2025 23  8 - 27 mg/dL Final    Creatinine 02/24/2025 0.98  0.57 - 1.00 mg/dL Final    EGFR Result 02/24/2025 57 (L)  >59 mL/min/1.73 Final    BUN/Creatinine Ratio 02/24/2025 23  12 - 28 Final    Sodium 02/24/2025 142  134 - 144 mmol/L Final    Potassium 02/24/2025 4.7  3.5 - 5.2 mmol/L Final    Chloride 02/24/2025 103  96 - 106 mmol/L Final    Total CO2 02/24/2025 25  20 - 29 mmol/L Final    Calcium 02/24/2025 9.3  8.7 - 10.3 mg/dL Final    Total Protein 02/24/2025 6.3  6.0 - 8.5 g/dL Final    Albumin 02/24/2025 3.9  3.7 - 4.7 g/dL Final    Globulin 02/24/2025 2.4  1.5 - 4.5 g/dL Final    Total Bilirubin 02/24/2025 0.4  0.0 - 1.2 mg/dL Final    Alkaline Phosphatase 02/24/2025 66  44 - 121 IU/L Final    AST (SGOT) 02/24/2025 19  0 - 40 IU/L Final    ALT (SGPT) 02/24/2025 14  0 - 32 IU/L Final    WBC 02/24/2025 6.7  3.4 - 10.8 x10E3/uL Final    RBC 02/24/2025 4.01  3.77 - 5.28 x10E6/uL Final    Hemoglobin 02/24/2025 12.4  11.1 - 15.9 g/dL Final    Hematocrit 02/24/2025 37.5  34.0 - 46.6 % Final    MCV 02/24/2025 94  79 - 97 fL Final    MCH 02/24/2025 30.9  26.6 - 33.0 pg Final    MCHC 02/24/2025 33.1  31.5 - 35.7 g/dL Final    RDW 02/24/2025 13.0  11.7 - 15.4 % Final    Platelets  02/24/2025 241  150 - 450 x10E3/uL Final    Neutrophil Rel % 02/24/2025 48  Not Estab. % Final    Lymphocyte Rel % 02/24/2025 39  Not Estab. % Final    Monocyte Rel % 02/24/2025 8  Not Estab. % Final    Eosinophil Rel % 02/24/2025 4  Not Estab. % Final    Basophil Rel % 02/24/2025 1  Not Estab. % Final    Neutrophils Absolute 02/24/2025 3.2  1.4 - 7.0 x10E3/uL Final    Lymphocytes Absolute 02/24/2025 2.6  0.7 - 3.1 x10E3/uL Final    Monocytes Absolute 02/24/2025 0.6  0.1 - 0.9 x10E3/uL Final    Eosinophils Absolute 02/24/2025 0.3  0.0 - 0.4 x10E3/uL Final    Basophils Absolute 02/24/2025 0.1  0.0 - 0.2 x10E3/uL Final    Immature Granulocyte Rel % 02/24/2025 0  Not Estab. % Final    Immature Grans Absolute 02/24/2025 0.0  0.0 - 0.1 x10E3/uL Final     Results  Imaging   - CT scan: 03/2025, No acute process, no hernia, tiny right pleural effusion.         Assessment & Plan   Diagnoses and all orders for this visit:    1. Type 2 diabetes mellitus with hyperglycemia, without long-term current use of insulin (Primary)  -     Hemoglobin A1c  -     CBC & Differential  -     Comprehensive Metabolic Panel    2. Essential hypertension  -     bumetanide (BUMEX) 1 MG tablet; Take 1 tablet by mouth Daily.  Dispense: 90 tablet; Refill: 3    3. Dyslipidemia    4. Localized osteoporosis without current pathological fracture    5. Pedal edema  -     bumetanide (BUMEX) 1 MG tablet; Take 1 tablet by mouth Daily.  Dispense: 90 tablet; Refill: 3    6. Postmenopausal  -     DEXA Bone Density Axial; Future      Assessment & Plan  1. Post-knee replacement discomfort.  - Discomfort in the knee may be due to tendons rather than the joint itself.  - Physical exam and patient report indicate gradual improvement.  - Advised to increase walking to maintain joint mobility.  - No further injections planned as the previous one was ineffective.    2. Diabetes mellitus.  - Blood glucose levels appear stable without symptoms of hyperglycemia or  hypoglycemia.  - A1c, kidney function, and potassium levels will be checked today.  - Educated on symptoms of high and low blood sugar.  - Continue current metformin regimen.    3. Soft tissue mass.  - Noted soft area in muscle, not indicative of a hernia.  - Physical exam and CAT scan confirm no hernia, only soft tissue involvement.  - Reassured no surgical intervention needed at present.  - Advised to monitor the area and seek medical attention if it worsens.    4. Health maintenance.  - Blood pressure excellent at 136/69 mmHg.  - Cholesterol levels assessed six months ago, no immediate re-evaluation needed.  - Continue current health practices, including taking vitamins.  - DEXA scan to be ordered at [place] to monitor bone density.  - Refills for Bumex and metformin provided.    Follow-up  The patient will follow up in 10/2025.        Call with any problems or concerns before next visit       Return in about 4 months (around 10/25/2025).  Patient or patient representative verbalized consent for the use of Ambient Listening during the visit with  Clarissa Ward MD for chart documentation. 6/25/2025  11:18 EDT    Part of this note may be an electronic transcription/translation of spoken language to printed text using the Dragon Dictation System    Clarissa Ward MD6/25/202511:16 EDT  This note has been electronically signed

## 2025-06-26 LAB
ALBUMIN SERPL-MCNC: 3.9 G/DL (ref 3.7–4.7)
ALP SERPL-CCNC: 77 IU/L (ref 44–121)
ALT SERPL-CCNC: 9 IU/L (ref 0–32)
AST SERPL-CCNC: 11 IU/L (ref 0–40)
BASOPHILS # BLD AUTO: 0.1 X10E3/UL (ref 0–0.2)
BASOPHILS NFR BLD AUTO: 1 %
BILIRUB SERPL-MCNC: 0.3 MG/DL (ref 0–1.2)
BUN SERPL-MCNC: 18 MG/DL (ref 8–27)
BUN/CREAT SERPL: 19 (ref 12–28)
CALCIUM SERPL-MCNC: 9.3 MG/DL (ref 8.7–10.3)
CHLORIDE SERPL-SCNC: 104 MMOL/L (ref 96–106)
CO2 SERPL-SCNC: 26 MMOL/L (ref 20–29)
CREAT SERPL-MCNC: 0.96 MG/DL (ref 0.57–1)
EGFRCR SERPLBLD CKD-EPI 2021: 58 ML/MIN/1.73
EOSINOPHIL # BLD AUTO: 0.1 X10E3/UL (ref 0–0.4)
EOSINOPHIL NFR BLD AUTO: 2 %
ERYTHROCYTE [DISTWIDTH] IN BLOOD BY AUTOMATED COUNT: 13 % (ref 11.7–15.4)
GLOBULIN SER CALC-MCNC: 2.5 G/DL (ref 1.5–4.5)
GLUCOSE SERPL-MCNC: 128 MG/DL (ref 70–99)
HBA1C MFR BLD: 8 % (ref 4.8–5.6)
HCT VFR BLD AUTO: 40.8 % (ref 34–46.6)
HGB BLD-MCNC: 12.5 G/DL (ref 11.1–15.9)
IMM GRANULOCYTES # BLD AUTO: 0 X10E3/UL (ref 0–0.1)
IMM GRANULOCYTES NFR BLD AUTO: 0 %
LYMPHOCYTES # BLD AUTO: 2.8 X10E3/UL (ref 0.7–3.1)
LYMPHOCYTES NFR BLD AUTO: 30 %
MCH RBC QN AUTO: 30.4 PG (ref 26.6–33)
MCHC RBC AUTO-ENTMCNC: 30.6 G/DL (ref 31.5–35.7)
MCV RBC AUTO: 99 FL (ref 79–97)
MONOCYTES # BLD AUTO: 0.8 X10E3/UL (ref 0.1–0.9)
MONOCYTES NFR BLD AUTO: 8 %
NEUTROPHILS # BLD AUTO: 5.6 X10E3/UL (ref 1.4–7)
NEUTROPHILS NFR BLD AUTO: 59 %
PLATELET # BLD AUTO: 253 X10E3/UL (ref 150–450)
POTASSIUM SERPL-SCNC: 4.8 MMOL/L (ref 3.5–5.2)
PROT SERPL-MCNC: 6.4 G/DL (ref 6–8.5)
RBC # BLD AUTO: 4.11 X10E6/UL (ref 3.77–5.28)
SODIUM SERPL-SCNC: 144 MMOL/L (ref 134–144)
WBC # BLD AUTO: 9.5 X10E3/UL (ref 3.4–10.8)